# Patient Record
Sex: FEMALE | Race: WHITE | NOT HISPANIC OR LATINO
[De-identification: names, ages, dates, MRNs, and addresses within clinical notes are randomized per-mention and may not be internally consistent; named-entity substitution may affect disease eponyms.]

---

## 2017-02-24 ENCOUNTER — APPOINTMENT (OUTPATIENT)
Dept: HEMATOLOGY ONCOLOGY | Facility: CLINIC | Age: 62
End: 2017-02-24

## 2017-08-23 ENCOUNTER — OUTPATIENT (OUTPATIENT)
Dept: OUTPATIENT SERVICES | Facility: HOSPITAL | Age: 62
LOS: 1 days | Discharge: HOME | End: 2017-08-23

## 2017-08-23 DIAGNOSIS — Z12.31 ENCOUNTER FOR SCREENING MAMMOGRAM FOR MALIGNANT NEOPLASM OF BREAST: ICD-10-CM

## 2018-06-20 ENCOUNTER — APPOINTMENT (OUTPATIENT)
Dept: UROLOGY | Facility: CLINIC | Age: 63
End: 2018-06-20
Payer: MEDICARE

## 2018-06-20 VITALS
BODY MASS INDEX: 32.99 KG/M2 | DIASTOLIC BLOOD PRESSURE: 65 MMHG | SYSTOLIC BLOOD PRESSURE: 104 MMHG | HEIGHT: 65 IN | HEART RATE: 81 BPM | WEIGHT: 198 LBS

## 2018-06-20 DIAGNOSIS — Z84.1 FAMILY HISTORY OF DISORDERS OF KIDNEY AND URETER: ICD-10-CM

## 2018-06-20 DIAGNOSIS — I10 ESSENTIAL (PRIMARY) HYPERTENSION: ICD-10-CM

## 2018-06-20 DIAGNOSIS — E72.11 HOMOCYSTINURIA: ICD-10-CM

## 2018-06-20 DIAGNOSIS — M19.90 UNSPECIFIED OSTEOARTHRITIS, UNSPECIFIED SITE: ICD-10-CM

## 2018-06-20 DIAGNOSIS — I51.9 HEART DISEASE, UNSPECIFIED: ICD-10-CM

## 2018-06-20 DIAGNOSIS — E87.6 HYPOKALEMIA: ICD-10-CM

## 2018-06-20 DIAGNOSIS — Z78.9 OTHER SPECIFIED HEALTH STATUS: ICD-10-CM

## 2018-06-20 DIAGNOSIS — E11.9 TYPE 2 DIABETES MELLITUS W/OUT COMPLICATIONS: ICD-10-CM

## 2018-06-20 PROCEDURE — 99203 OFFICE O/P NEW LOW 30 MIN: CPT

## 2018-06-20 RX ORDER — LIDOCAINE HYDROCHLORIDE 10 MG/ML
1 INJECTION, SOLUTION INFILTRATION; PERINEURAL
Qty: 20 | Refills: 0 | Status: ACTIVE | COMMUNITY
Start: 2017-02-03

## 2018-06-20 RX ORDER — INSULIN GLARGINE 100 [IU]/ML
100 INJECTION, SOLUTION SUBCUTANEOUS
Qty: 10 | Refills: 0 | Status: ACTIVE | COMMUNITY
Start: 2016-07-12

## 2018-06-20 RX ORDER — LACTULOSE 10 G/15ML
10 SOLUTION ORAL
Qty: 473 | Refills: 0 | Status: ACTIVE | COMMUNITY
Start: 2017-01-30

## 2018-06-20 RX ORDER — DULOXETINE HYDROCHLORIDE 60 MG/1
60 CAPSULE, DELAYED RELEASE PELLETS ORAL
Qty: 30 | Refills: 0 | Status: ACTIVE | COMMUNITY
Start: 2016-06-03

## 2018-06-20 RX ORDER — LISINOPRIL 40 MG/1
40 TABLET ORAL
Qty: 30 | Refills: 0 | Status: ACTIVE | COMMUNITY
Start: 2016-06-14

## 2018-06-20 RX ORDER — INSULIN HUMAN 100 [IU]/ML
100 INJECTION, SOLUTION PARENTERAL
Qty: 3 | Refills: 0 | Status: ACTIVE | COMMUNITY
Start: 2016-08-05

## 2018-06-20 RX ORDER — DULOXETINE HYDROCHLORIDE 30 MG/1
30 CAPSULE, DELAYED RELEASE PELLETS ORAL
Qty: 30 | Refills: 0 | Status: ACTIVE | COMMUNITY
Start: 2016-06-03

## 2018-06-20 RX ORDER — IPRATROPIUM BROMIDE AND ALBUTEROL SULFATE 2.5; .5 MG/3ML; MG/3ML
0.5-2.5 (3) SOLUTION RESPIRATORY (INHALATION)
Qty: 90 | Refills: 0 | Status: ACTIVE | COMMUNITY
Start: 2017-02-01

## 2018-06-20 RX ORDER — CARVEDILOL 6.25 MG/1
6.25 TABLET, FILM COATED ORAL
Qty: 60 | Refills: 0 | Status: ACTIVE | COMMUNITY
Start: 2016-06-14

## 2018-06-20 RX ORDER — ATORVASTATIN CALCIUM 40 MG/1
40 TABLET, FILM COATED ORAL
Qty: 30 | Refills: 0 | Status: ACTIVE | COMMUNITY
Start: 2016-06-17

## 2018-06-20 RX ORDER — METFORMIN HYDROCHLORIDE 850 MG/1
850 TABLET, COATED ORAL
Qty: 60 | Refills: 0 | Status: ACTIVE | COMMUNITY
Start: 2016-06-20

## 2018-06-20 RX ORDER — GABAPENTIN 100 MG/1
100 CAPSULE ORAL
Qty: 180 | Refills: 0 | Status: ACTIVE | COMMUNITY
Start: 2016-02-04

## 2018-06-20 RX ORDER — OXYCODONE AND ACETAMINOPHEN 10; 325 MG/1; MG/1
10-325 TABLET ORAL
Qty: 90 | Refills: 0 | Status: ACTIVE | COMMUNITY
Start: 2016-10-16

## 2018-06-20 RX ORDER — FUROSEMIDE 40 MG/1
40 TABLET ORAL
Qty: 14 | Refills: 0 | Status: ACTIVE | COMMUNITY
Start: 2016-06-19

## 2018-06-20 RX ORDER — CEFTRIAXONE 1 G/1
1 INJECTION, POWDER, FOR SOLUTION INTRAMUSCULAR; INTRAVENOUS
Qty: 3 | Refills: 0 | Status: ACTIVE | COMMUNITY
Start: 2017-02-02

## 2018-06-20 RX ORDER — LEVOCETIRIZINE DIHYDROCHLORIDE 5 MG/1
5 TABLET ORAL
Qty: 30 | Refills: 0 | Status: ACTIVE | COMMUNITY
Start: 2016-07-05

## 2018-06-20 RX ORDER — PREDNISONE 10 MG/1
10 TABLET ORAL
Qty: 18 | Refills: 0 | Status: ACTIVE | COMMUNITY
Start: 2017-02-02

## 2018-06-20 RX ORDER — AMLODIPINE BESYLATE 5 MG/1
5 TABLET ORAL
Qty: 30 | Refills: 0 | Status: ACTIVE | COMMUNITY
Start: 2016-07-01

## 2018-06-20 RX ORDER — SILVER SULFADIAZINE 10 MG/G
1 CREAM TOPICAL
Qty: 50 | Refills: 0 | Status: ACTIVE | COMMUNITY
Start: 2017-01-30

## 2018-06-20 RX ORDER — BACLOFEN 10 MG/1
10 TABLET ORAL
Qty: 90 | Refills: 0 | Status: ACTIVE | COMMUNITY
Start: 2016-07-17

## 2018-06-20 RX ORDER — ZOLPIDEM TARTRATE 10 MG/1
10 TABLET ORAL
Qty: 30 | Refills: 0 | Status: ACTIVE | COMMUNITY
Start: 2016-10-18

## 2018-06-20 RX ORDER — ISOSORBIDE MONONITRATE 60 MG/1
60 TABLET, EXTENDED RELEASE ORAL
Qty: 30 | Refills: 0 | Status: ACTIVE | COMMUNITY
Start: 2016-06-22

## 2018-06-20 RX ORDER — ZOLPIDEM TARTRATE 5 MG/1
5 TABLET ORAL
Qty: 30 | Refills: 0 | Status: ACTIVE | COMMUNITY
Start: 2016-11-12

## 2018-06-20 RX ORDER — LEVOFLOXACIN 500 MG/1
500 TABLET, FILM COATED ORAL
Qty: 7 | Refills: 0 | Status: ACTIVE | COMMUNITY
Start: 2016-12-24

## 2018-06-25 ENCOUNTER — INPATIENT (INPATIENT)
Facility: HOSPITAL | Age: 63
LOS: 7 days | Discharge: NURSING, REHAB OR LONG-TERM CARE | End: 2018-07-03
Attending: INTERNAL MEDICINE | Admitting: INTERNAL MEDICINE
Payer: MEDICARE

## 2018-06-25 VITALS
RESPIRATION RATE: 18 BRPM | OXYGEN SATURATION: 96 % | SYSTOLIC BLOOD PRESSURE: 95 MMHG | HEART RATE: 78 BPM | DIASTOLIC BLOOD PRESSURE: 59 MMHG | TEMPERATURE: 98 F

## 2018-06-25 LAB
ALBUMIN SERPL ELPH-MCNC: 3.1 G/DL — LOW (ref 3.5–5.2)
ALP SERPL-CCNC: 97 U/L — SIGNIFICANT CHANGE UP (ref 30–115)
ALT FLD-CCNC: 11 U/L — SIGNIFICANT CHANGE UP (ref 0–41)
ANION GAP SERPL CALC-SCNC: 15 MMOL/L — HIGH (ref 7–14)
APTT BLD: 32.2 SEC — SIGNIFICANT CHANGE UP (ref 27–39.2)
AST SERPL-CCNC: 23 U/L — SIGNIFICANT CHANGE UP (ref 0–41)
BILIRUB DIRECT SERPL-MCNC: <0.2 MG/DL — SIGNIFICANT CHANGE UP (ref 0–0.2)
BILIRUB INDIRECT FLD-MCNC: SIGNIFICANT CHANGE UP MG/DL (ref 0.2–1.2)
BILIRUB SERPL-MCNC: <0.2 MG/DL — SIGNIFICANT CHANGE UP (ref 0.2–1.2)
BLD GP AB SCN SERPL QL: SIGNIFICANT CHANGE UP
BUN SERPL-MCNC: 23 MG/DL — HIGH (ref 10–20)
CALCIUM SERPL-MCNC: 9.2 MG/DL — SIGNIFICANT CHANGE UP (ref 8.5–10.1)
CHLORIDE SERPL-SCNC: 96 MMOL/L — LOW (ref 98–110)
CO2 SERPL-SCNC: 23 MMOL/L — SIGNIFICANT CHANGE UP (ref 17–32)
CREAT SERPL-MCNC: 1.1 MG/DL — SIGNIFICANT CHANGE UP (ref 0.7–1.5)
ERYTHROCYTE [SEDIMENTATION RATE] IN BLOOD: 86 MM/HR — HIGH (ref 0–20)
ESTIMATED AVERAGE GLUCOSE: 237 MG/DL — HIGH (ref 68–114)
GLUCOSE SERPL-MCNC: 201 MG/DL — HIGH (ref 70–99)
HBA1C BLD-MCNC: 9.9 % — HIGH (ref 4–5.6)
HCT VFR BLD CALC: 32.6 % — LOW (ref 37–47)
HGB BLD-MCNC: 10.5 G/DL — LOW (ref 12–16)
INR BLD: 1.21 RATIO — SIGNIFICANT CHANGE UP (ref 0.65–1.3)
MCHC RBC-ENTMCNC: 26.4 PG — LOW (ref 27–31)
MCHC RBC-ENTMCNC: 32.2 G/DL — SIGNIFICANT CHANGE UP (ref 32–37)
MCV RBC AUTO: 81.9 FL — SIGNIFICANT CHANGE UP (ref 81–99)
NRBC # BLD: 0 /100 WBCS — SIGNIFICANT CHANGE UP (ref 0–0)
PLATELET # BLD AUTO: 258 K/UL — SIGNIFICANT CHANGE UP (ref 130–400)
POTASSIUM SERPL-MCNC: 4.5 MMOL/L — SIGNIFICANT CHANGE UP (ref 3.5–5)
POTASSIUM SERPL-SCNC: 4.5 MMOL/L — SIGNIFICANT CHANGE UP (ref 3.5–5)
PROT SERPL-MCNC: 6.6 G/DL — SIGNIFICANT CHANGE UP (ref 6–8)
PROTHROM AB SERPL-ACNC: 13 SEC — HIGH (ref 9.95–12.87)
RBC # BLD: 3.98 M/UL — LOW (ref 4.2–5.4)
RBC # FLD: 19.6 % — HIGH (ref 11.5–14.5)
SODIUM SERPL-SCNC: 134 MMOL/L — LOW (ref 135–146)
TYPE + AB SCN PNL BLD: SIGNIFICANT CHANGE UP
WBC # BLD: 14.55 K/UL — HIGH (ref 4.8–10.8)
WBC # FLD AUTO: 14.55 K/UL — HIGH (ref 4.8–10.8)

## 2018-06-25 PROCEDURE — 93925 LOWER EXTREMITY STUDY: CPT | Mod: 26

## 2018-06-25 PROCEDURE — 93923 UPR/LXTR ART STDY 3+ LVLS: CPT | Mod: 26

## 2018-06-25 PROCEDURE — 93970 EXTREMITY STUDY: CPT | Mod: 26

## 2018-06-25 RX ORDER — FUROSEMIDE 40 MG
0 TABLET ORAL
Qty: 30 | Refills: 0 | COMMUNITY

## 2018-06-25 RX ORDER — DEXTROSE 50 % IN WATER 50 %
12.5 SYRINGE (ML) INTRAVENOUS ONCE
Qty: 0 | Refills: 0 | Status: DISCONTINUED | OUTPATIENT
Start: 2018-06-25 | End: 2018-06-29

## 2018-06-25 RX ORDER — BACLOFEN 100 %
0 POWDER (GRAM) MISCELLANEOUS
Qty: 90 | Refills: 0 | COMMUNITY

## 2018-06-25 RX ORDER — GLUCAGON INJECTION, SOLUTION 0.5 MG/.1ML
1 INJECTION, SOLUTION SUBCUTANEOUS ONCE
Qty: 0 | Refills: 0 | Status: DISCONTINUED | OUTPATIENT
Start: 2018-06-25 | End: 2018-06-29

## 2018-06-25 RX ORDER — SODIUM CHLORIDE 9 MG/ML
1000 INJECTION, SOLUTION INTRAVENOUS
Qty: 0 | Refills: 0 | Status: DISCONTINUED | OUTPATIENT
Start: 2018-06-25 | End: 2018-06-29

## 2018-06-25 RX ORDER — LEVOCETIRIZINE DIHYDROCHLORIDE 0.5 MG/ML
0 SOLUTION ORAL
Qty: 30 | Refills: 0 | COMMUNITY

## 2018-06-25 RX ORDER — CARVEDILOL PHOSPHATE 80 MG/1
0 CAPSULE, EXTENDED RELEASE ORAL
Qty: 120 | Refills: 0 | COMMUNITY

## 2018-06-25 RX ORDER — VANCOMYCIN HCL 1 G
1000 VIAL (EA) INTRAVENOUS ONCE
Qty: 0 | Refills: 0 | Status: COMPLETED | OUTPATIENT
Start: 2018-06-25 | End: 2018-06-25

## 2018-06-25 RX ORDER — OXYCODONE AND ACETAMINOPHEN 5; 325 MG/1; MG/1
1 TABLET ORAL ONCE
Qty: 0 | Refills: 0 | Status: DISCONTINUED | OUTPATIENT
Start: 2018-06-25 | End: 2018-06-25

## 2018-06-25 RX ORDER — VANCOMYCIN HCL 1 G
1000 VIAL (EA) INTRAVENOUS EVERY 12 HOURS
Qty: 0 | Refills: 0 | Status: DISCONTINUED | OUTPATIENT
Start: 2018-06-26 | End: 2018-06-29

## 2018-06-25 RX ORDER — NICOTINE POLACRILEX 2 MG
1 GUM BUCCAL DAILY
Qty: 0 | Refills: 0 | Status: DISCONTINUED | OUTPATIENT
Start: 2018-06-25 | End: 2018-06-29

## 2018-06-25 RX ORDER — METOLAZONE 5 MG/1
0 TABLET ORAL
Qty: 30 | Refills: 0 | COMMUNITY

## 2018-06-25 RX ORDER — DEXTROSE 50 % IN WATER 50 %
25 SYRINGE (ML) INTRAVENOUS ONCE
Qty: 0 | Refills: 0 | Status: DISCONTINUED | OUTPATIENT
Start: 2018-06-25 | End: 2018-06-29

## 2018-06-25 RX ORDER — UMECLIDINIUM BROMIDE AND VILANTEROL TRIFENATATE 62.5; 25 UG/1; UG/1
0 POWDER RESPIRATORY (INHALATION)
Qty: 60 | Refills: 0 | COMMUNITY

## 2018-06-25 RX ORDER — AMPICILLIN SODIUM AND SULBACTAM SODIUM 250; 125 MG/ML; MG/ML
1.5 INJECTION, POWDER, FOR SUSPENSION INTRAMUSCULAR; INTRAVENOUS EVERY 6 HOURS
Qty: 0 | Refills: 0 | Status: DISCONTINUED | OUTPATIENT
Start: 2018-06-25 | End: 2018-06-26

## 2018-06-25 RX ORDER — INSULIN LISPRO 100/ML
2 VIAL (ML) SUBCUTANEOUS
Qty: 0 | Refills: 0 | Status: DISCONTINUED | OUTPATIENT
Start: 2018-06-25 | End: 2018-06-26

## 2018-06-25 RX ORDER — ENOXAPARIN SODIUM 100 MG/ML
40 INJECTION SUBCUTANEOUS DAILY
Qty: 0 | Refills: 0 | Status: DISCONTINUED | OUTPATIENT
Start: 2018-06-25 | End: 2018-06-29

## 2018-06-25 RX ORDER — INSULIN GLARGINE 100 [IU]/ML
40 INJECTION, SOLUTION SUBCUTANEOUS AT BEDTIME
Qty: 0 | Refills: 0 | Status: DISCONTINUED | OUTPATIENT
Start: 2018-06-25 | End: 2018-06-26

## 2018-06-25 RX ORDER — OXYCODONE AND ACETAMINOPHEN 5; 325 MG/1; MG/1
1 TABLET ORAL EVERY 4 HOURS
Qty: 0 | Refills: 0 | Status: DISCONTINUED | OUTPATIENT
Start: 2018-06-25 | End: 2018-06-26

## 2018-06-25 RX ORDER — OMEPRAZOLE 10 MG/1
0 CAPSULE, DELAYED RELEASE ORAL
Qty: 30 | Refills: 0 | COMMUNITY

## 2018-06-25 RX ORDER — MIRTAZAPINE 45 MG/1
0 TABLET, ORALLY DISINTEGRATING ORAL
Qty: 30 | Refills: 0 | COMMUNITY

## 2018-06-25 RX ORDER — INSULIN LISPRO 100/ML
12 VIAL (ML) SUBCUTANEOUS
Qty: 0 | Refills: 0 | Status: DISCONTINUED | OUTPATIENT
Start: 2018-06-25 | End: 2018-06-25

## 2018-06-25 RX ORDER — FUROSEMIDE 40 MG
20 TABLET ORAL DAILY
Qty: 0 | Refills: 0 | Status: DISCONTINUED | OUTPATIENT
Start: 2018-06-25 | End: 2018-06-29

## 2018-06-25 RX ORDER — CEFTRIAXONE 500 MG/1
2 INJECTION, POWDER, FOR SOLUTION INTRAMUSCULAR; INTRAVENOUS ONCE
Qty: 0 | Refills: 0 | Status: COMPLETED | OUTPATIENT
Start: 2018-06-25 | End: 2018-06-25

## 2018-06-25 RX ORDER — LACTULOSE 10 G/15ML
0 SOLUTION ORAL
Qty: 473 | Refills: 0 | COMMUNITY

## 2018-06-25 RX ORDER — DEXTROSE 50 % IN WATER 50 %
15 SYRINGE (ML) INTRAVENOUS ONCE
Qty: 0 | Refills: 0 | Status: DISCONTINUED | OUTPATIENT
Start: 2018-06-25 | End: 2018-06-29

## 2018-06-25 RX ORDER — IPRATROPIUM/ALBUTEROL SULFATE 18-103MCG
0 AEROSOL WITH ADAPTER (GRAM) INHALATION
Qty: 90 | Refills: 0 | COMMUNITY

## 2018-06-25 RX ADMIN — AMPICILLIN SODIUM AND SULBACTAM SODIUM 100 GRAM(S): 250; 125 INJECTION, POWDER, FOR SUSPENSION INTRAMUSCULAR; INTRAVENOUS at 22:44

## 2018-06-25 RX ADMIN — Medication 1 PATCH: at 22:44

## 2018-06-25 RX ADMIN — OXYCODONE AND ACETAMINOPHEN 1 TABLET(S): 5; 325 TABLET ORAL at 18:20

## 2018-06-25 RX ADMIN — OXYCODONE AND ACETAMINOPHEN 1 TABLET(S): 5; 325 TABLET ORAL at 21:33

## 2018-06-25 RX ADMIN — INSULIN GLARGINE 40 UNIT(S): 100 INJECTION, SOLUTION SUBCUTANEOUS at 22:45

## 2018-06-25 RX ADMIN — OXYCODONE AND ACETAMINOPHEN 1 TABLET(S): 5; 325 TABLET ORAL at 17:58

## 2018-06-25 RX ADMIN — CEFTRIAXONE 100 GRAM(S): 500 INJECTION, POWDER, FOR SOLUTION INTRAMUSCULAR; INTRAVENOUS at 16:30

## 2018-06-25 RX ADMIN — Medication 250 MILLIGRAM(S): at 17:40

## 2018-06-25 NOTE — H&P ADULT - ATTENDING COMMENTS
Patient seen and examined at the bedside. not in distress.   Agree with above note.   Admitted for the foot gangrene. will follow up with vascular and podiatry

## 2018-06-25 NOTE — H&P ADULT - PMH
COPD (chronic obstructive pulmonary disease)    Depression    Diabetes    Hypertension    Psychiatric disorder

## 2018-06-25 NOTE — H&P ADULT - ASSESSMENT
64 y/o female with h/o PVD, copd not on home oxygen , ELEUTERIO not on cpap, dm-2 , htn , psychiatric disorder sent in from NH as per podiatry referral for evaluation and treatment of Right foot gangrene and worsening pain    1)NECROTIC RIGHT 2 ND DIGIT -  ADMIT TO MEDICINE  Podiatry recs- ID and vascular consult  wound dressing as per ID  Blood glucose control   prn pain control  blood culture  add iv abx - unasyn and vancomycin     2)HTN- stable  3)PAD- f/u vascular consult  4)copd- stable , no respiratory distress  advised to quit smoking  5)psychiatric disorder   6)dvt ppx  7)full code , from [facility] 62 y/o female with h/o PVD, copd not on home oxygen , ELEUTERIO not on cpap, dm-2 , htn , psychiatric disorder sent in from NH as per podiatry referral for evaluation and treatment of Right foot gangrene and worsening pain    1)NECROTIC RIGHT 2 ND DIGIT -  ADMIT TO MEDICINE  Podiatry recs- ID and vascular consult  wound dressing as per ID  Blood glucose control   prn pain control  blood culture  add iv abx - unasyn and vancomycin   xray right foot - ordered f/u read    2)HTN- stable  3)PAD- f/u vascular consult  4)copd- stable , no respiratory distress  advised to quit smoking  5)psychiatric disorder   6)dvt ppx  7)full code , from NH 64 y/o female with h/o PVD, copd not on home oxygen , ELEUTERIO not on cpap, dm-2 , htn , psychiatric disorder sent in from NH as per podiatry referral for evaluation and treatment of Right foot gangrene and worsening pain    1)NECROTIC RIGHT 2 ND DIGIT -  ADMIT TO MEDICINE  Podiatry recs- ID and vascular consult  wound dressing as per ID  Blood glucose control   prn pain control  blood culture  add iv abx - unasyn and vancomycin   xray right foot - ordered f/u read    2)HTN- stable  3)PAD- f/u vascular consult  4)copd- stable , no respiratory distress  advised to quit smoking  5)h/o dm-2 on NH records and as per patient- no medication  get one hba1c  5)psychiatric disorder - patient not on any psych medications as per NH records  6)dvt ppx  7)DNR/DNI , from NH 62 y/o female with h/o PVD, copd not on home oxygen , ELEUTERIO not on cpap, dm-2 , htn , psychiatric disorder sent in from NH as per podiatry referral for evaluation and treatment of Right foot gangrene and worsening pain    1)NECROTIC RIGHT 2 ND DIGIT -  ADMIT TO MEDICINE  Podiatry recs- ID and vascular consult  wound dressing as per ID  Blood glucose control   prn pain control  blood culture  add iv abx - unasyn and vancomycin   xray right foot - ordered f/u read    2)HTN- stable  3)PAD- f/u vascular consult  4)copd- stable , no respiratory distress  advised to quit smoking  5)h/o dm-2 on NH records and as per patient- no medication  get one hba1c  5)psychiatric disorder - patient not on any psych medications as per NH records  please reconfirm meds in am from NH   6)dvt ppx  7)DNR/DNI , from NH 64 y/o female with h/o PVD, copd not on home oxygen , ELETUERIO not on cpap, dm-2 , htn , psychiatric disorder sent in from NH as per podiatry referral for evaluation and treatment of Right foot gangrene and worsening pain    1) NECROTIC RIGHT 2 ND DIGIT -  ADMIT TO MEDICINE  Podiatry recs- ID and vascular consult  wound dressing as per ID  Blood glucose control   prn pain control  blood culture  add iv abx - unasyn and vancomycin   xray right foot - ordered f/u read    2)HTN- stable  3)PAD- f/u vascular consult  4)copd- stable , no respiratory distress  advised to quit smoking    5)h/o dm-2 on NH records and as per patient- no medication    get one hba1c    5)psychiatric disorder - patient not on any psych medications as per NH records    6)dvt ppx  7)DNR/DNI , from NH

## 2018-06-25 NOTE — ED PROVIDER NOTE - PHYSICAL EXAMINATION
CONSTITUTIONAL: well-appearing, in NAD  HEAD: NCAT  EYES: EOMI, PERRLA, no scleral icterus  CARD: RRR, no murmurs.  RESP: clear to ausculation b/l.  No rales, rhonchi, or wheezing.  ABD: soft, non-tender, non-distended, no rebound or guarding.  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 Multiple ulcerations on both feet. No DP or TP pulses palpated.  NEURO: Motor intact in b/l LE. Sensation diminished.  PSYCH: Cooperative, appropriate.

## 2018-06-25 NOTE — H&P ADULT - NSHPPHYSICALEXAM_GEN_ALL_CORE
General: NAD, AAO x3  HEENT: No icterus,. Moist mucous membranes  Neck: No JVD noted. Supple, no meningismus  Cardio: S1, S2 noted, RRR. No murmurs, rubs or gallops  Resp: Clear to auscultation b/l. No adventitious sounds  Abdo: Soft, NT, bowel sounds present. No organomegaly  Extremities: b/l 1 +  edema noted. Pulses present b/l  Neuro: AAO x3, grossly normal motor strength.  Skin: Dry, no rashes General: NAD, AAO x3  HEENT: No icterus,. Moist mucous membranes  Neck: No JVD noted. Supple, no meningismus  Cardio: S1, S2 noted, RRR. No murmurs, rubs or gallops  Resp: Clear to auscultation b/l. No adventitious sounds  Abdo: Soft, NT, bowel sounds present. No organomegaly  Extremities: b/l 1 +  edema noted. Pulses present b/l , bandage + b/l foot -done by podiatry   Neuro: AAO x3, grossly normal motor strength.

## 2018-06-25 NOTE — ED PROVIDER NOTE - NS ED ROS FT
Constitutional:  No fever, chills, lethargy.  Cardiac:  No chest pain or palpitations  Respiratory:  No cough or respiratory distress.   GI:  No abdominal pain.  Neuro:  + pain and numbness in b/l feet  Skin:  R foot gangrene.

## 2018-06-25 NOTE — H&P ADULT - NSHPLABSRESULTS_GEN_ALL_CORE
LABS:                        10.5   14.55 )-----------( 258      ( 25 Jun 2018 15:29 )             32.6     25 Jun 2018 15:29    134    |  96     |  23     ----------------------------<  201    4.5     |  23     |  1.1      Ca    9.2        25 Jun 2018 15:29    TPro  6.6    /  Alb  3.1    /  TBili  <0.2   /  DBili  <0.2   /  AST  23     /  ALT  11     /  AlkPhos  97     25 Jun 2018 15:29    PT/INR - ( 25 Jun 2018 15:29 )   PT: 13.00 sec;   INR: 1.21 ratio         PTT - ( 25 Jun 2018 15:29 )  PTT:32.2 sec

## 2018-06-25 NOTE — ED PROVIDER NOTE - PROGRESS NOTE DETAILS
ATTENDING NOTE: 62 y/o female with hx of PVD, previous Right 1st toe amputations. c/o increased pain to right toes x 1 week. No fever. O/E: Wet gangrene to right toes 2 and 3. Normal temperature and color to foot. No acute arterial occlusion. A/P: Abx, podiatry consult, admit. Pt. was seen by podiatry at bedside.

## 2018-06-25 NOTE — H&P ADULT - NSHPREVIEWOFSYSTEMS_GEN_ALL_CORE
REVIEW OF SYSTEMS:    CONSTITUTIONAL: No weakness, fevers or chills  EYES/ENT: No visual changes;  No vertigo or throat pain   NECK: No pain or stiffness  RESPIRATORY: No cough, wheezing, hemoptysis; No shortness of breath  CARDIOVASCULAR: No chest pain or palpitations  GASTROINTESTINAL: No abdominal or epigastric pain. No nausea, vomiting, or hematemesis; No diarrhea or constipation. No melena or hematochezia.  GENITOURINARY: No dysuria, frequency or hematuria  NEUROLOGICAL: No numbness or weakness  SKIN: chronic wound and ulcer right foot REVIEW OF SYSTEMS:    CONSTITUTIONAL: No weakness, fevers or chills  EYES/ENT: No visual changes;  No vertigo or throat pain   NECK: No pain or stiffness  RESPIRATORY: No cough, wheezing, hemoptysis; No shortness of breath  CARDIOVASCULAR: No chest pain or palpitations  GASTROINTESTINAL: No abdominal or epigastric pain. No nausea, vomiting, or hematemesis; No diarrhea or constipation. No melena or hematochezia.  GENITOURINARY: No dysuria, frequency or hematuria  NEUROLOGICAL: No numbness or weakness  SKIN: chronic wound and ulcer right 2nd digit

## 2018-06-25 NOTE — ED PROVIDER NOTE - OBJECTIVE STATEMENT
64 y/o F w/ PMHx PVD, HTN, COPD, DM p/w R foot gangrene from nursing home referred by her Podiatrist Dr. Tobias. Currently pt has no complaints except for pain in her feet which is chronic. She denies fever, SOB, CP, any new ulcerations, worsening edema. Pt. states that she was sent for admission for continuous IV antibiotics and pre-op evaluation for R toe amputations.

## 2018-06-25 NOTE — H&P ADULT - HISTORY OF PRESENT ILLNESS
64 y/o Female smoker  w/ history of PVD, HTN, COPD not on home oxygen ,ELEUTERIO not on cpap , DM-2  sent in from NH for  Right foot gangrene, referred by her Podiatrist Dr. Tobias.  As per patient she has pain right  foot worsening x 1 week partially relieved by percocet. Denies fever, chills, nausea , vomiting or any constitutional symptoms. As per patient she is being sent in from for IV antibiotics and further evaluation for possible debridement of right toe.  In ED rX ROCEPHIN 2 G, VANCOMYCIN AND PERCOCET 62 y/o Female smoker  w/ history of PVD, HTN, COPD not on home oxygen ,ELEUTERIO not on cpap , DM-2  sent in from NH for necrotic Right 2nd digit , referred by her Podiatrist Dr. Tobias.  As per patient she has pain right  foot worsening x 1 week partially relieved by percocet. Denies fever, chills, nausea , vomiting or any constitutional symptoms. As per patient she is being sent in from for IV antibiotics and further evaluation for possible debridement of right toe.    In ED rX ROCEPHIN 2 G, VANCOMYCIN AND PERCOCET 62 y/o Female smoker  w/ history of PVD, HTN, COPD not on home oxygen ,ELEUTERIO not on cpap , DM-2(unsure about history)  sent in from NH for necrotic Right 2nd digit , referred by her Podiatrist Dr. Tobias.  As per patient she has pain right  foot worsening x 1 week partially relieved by percocet. Denies fever, chills, nausea , vomiting or any constitutional symptoms. As per patient she is being sent in from for IV antibiotics and further evaluation for possible debridement of right toe.    In ED rX ROCEPHIN 2 G, VANCOMYCIN AND PERCOCET

## 2018-06-25 NOTE — CONSULT NOTE ADULT - ASSESSMENT
She has multi system dz with wet gangrene to he right foot    she will need medical clearance and vasc eval for severe PVD    continue with IV Abx and f/u with ID recommendation      recommend stop smoking     pt is being scheduled for TMA, debridement of soft tissue and bone right foot

## 2018-06-26 LAB
ANION GAP SERPL CALC-SCNC: 13 MMOL/L — SIGNIFICANT CHANGE UP (ref 7–14)
BUN SERPL-MCNC: 21 MG/DL — HIGH (ref 10–20)
CALCIUM SERPL-MCNC: 9.6 MG/DL — SIGNIFICANT CHANGE UP (ref 8.5–10.1)
CHLORIDE SERPL-SCNC: 97 MMOL/L — LOW (ref 98–110)
CO2 SERPL-SCNC: 27 MMOL/L — SIGNIFICANT CHANGE UP (ref 17–32)
CREAT SERPL-MCNC: 0.8 MG/DL — SIGNIFICANT CHANGE UP (ref 0.7–1.5)
ESTIMATED AVERAGE GLUCOSE: 237 MG/DL — HIGH (ref 68–114)
GLUCOSE SERPL-MCNC: 168 MG/DL — HIGH (ref 70–99)
HBA1C BLD-MCNC: 9.9 % — HIGH (ref 4–5.6)
HCT VFR BLD CALC: 31.2 % — LOW (ref 37–47)
HGB BLD-MCNC: 9.9 G/DL — LOW (ref 12–16)
MCHC RBC-ENTMCNC: 26 PG — LOW (ref 27–31)
MCHC RBC-ENTMCNC: 31.7 G/DL — LOW (ref 32–37)
MCV RBC AUTO: 81.9 FL — SIGNIFICANT CHANGE UP (ref 81–99)
NRBC # BLD: 0 /100 WBCS — SIGNIFICANT CHANGE UP (ref 0–0)
PLATELET # BLD AUTO: 250 K/UL — SIGNIFICANT CHANGE UP (ref 130–400)
POTASSIUM SERPL-MCNC: 3.9 MMOL/L — SIGNIFICANT CHANGE UP (ref 3.5–5)
POTASSIUM SERPL-SCNC: 3.9 MMOL/L — SIGNIFICANT CHANGE UP (ref 3.5–5)
RBC # BLD: 3.81 M/UL — LOW (ref 4.2–5.4)
RBC # FLD: 19.5 % — HIGH (ref 11.5–14.5)
SODIUM SERPL-SCNC: 137 MMOL/L — SIGNIFICANT CHANGE UP (ref 135–146)
WBC # BLD: 11.62 K/UL — HIGH (ref 4.8–10.8)
WBC # FLD AUTO: 11.62 K/UL — HIGH (ref 4.8–10.8)

## 2018-06-26 RX ORDER — AMPICILLIN SODIUM AND SULBACTAM SODIUM 250; 125 MG/ML; MG/ML
INJECTION, POWDER, FOR SUSPENSION INTRAMUSCULAR; INTRAVENOUS
Qty: 0 | Refills: 0 | Status: DISCONTINUED | OUTPATIENT
Start: 2018-06-26 | End: 2018-06-29

## 2018-06-26 RX ORDER — OXYCODONE AND ACETAMINOPHEN 5; 325 MG/1; MG/1
2 TABLET ORAL EVERY 6 HOURS
Qty: 0 | Refills: 0 | Status: DISCONTINUED | OUTPATIENT
Start: 2018-06-26 | End: 2018-06-26

## 2018-06-26 RX ORDER — INSULIN GLARGINE 100 [IU]/ML
20 INJECTION, SOLUTION SUBCUTANEOUS AT BEDTIME
Qty: 0 | Refills: 0 | Status: DISCONTINUED | OUTPATIENT
Start: 2018-06-26 | End: 2018-06-29

## 2018-06-26 RX ORDER — INSULIN LISPRO 100/ML
7 VIAL (ML) SUBCUTANEOUS
Qty: 0 | Refills: 0 | Status: DISCONTINUED | OUTPATIENT
Start: 2018-06-26 | End: 2018-06-29

## 2018-06-26 RX ORDER — MORPHINE SULFATE 50 MG/1
4 CAPSULE, EXTENDED RELEASE ORAL EVERY 4 HOURS
Qty: 0 | Refills: 0 | Status: DISCONTINUED | OUTPATIENT
Start: 2018-06-26 | End: 2018-06-29

## 2018-06-26 RX ORDER — AMPICILLIN SODIUM AND SULBACTAM SODIUM 250; 125 MG/ML; MG/ML
3 INJECTION, POWDER, FOR SUSPENSION INTRAMUSCULAR; INTRAVENOUS ONCE
Qty: 0 | Refills: 0 | Status: COMPLETED | OUTPATIENT
Start: 2018-06-26 | End: 2018-06-26

## 2018-06-26 RX ORDER — AMPICILLIN SODIUM AND SULBACTAM SODIUM 250; 125 MG/ML; MG/ML
3 INJECTION, POWDER, FOR SUSPENSION INTRAMUSCULAR; INTRAVENOUS EVERY 6 HOURS
Qty: 0 | Refills: 0 | Status: DISCONTINUED | OUTPATIENT
Start: 2018-06-26 | End: 2018-06-29

## 2018-06-26 RX ORDER — INSULIN LISPRO 100/ML
VIAL (ML) SUBCUTANEOUS
Qty: 0 | Refills: 0 | Status: DISCONTINUED | OUTPATIENT
Start: 2018-06-26 | End: 2018-06-29

## 2018-06-26 RX ORDER — INSULIN GLARGINE 100 [IU]/ML
23 INJECTION, SOLUTION SUBCUTANEOUS AT BEDTIME
Qty: 0 | Refills: 0 | Status: DISCONTINUED | OUTPATIENT
Start: 2018-06-26 | End: 2018-06-26

## 2018-06-26 RX ORDER — INSULIN LISPRO 100/ML
2 VIAL (ML) SUBCUTANEOUS ONCE
Qty: 0 | Refills: 0 | Status: COMPLETED | OUTPATIENT
Start: 2018-06-26 | End: 2018-06-26

## 2018-06-26 RX ADMIN — Medication 250 MILLIGRAM(S): at 05:48

## 2018-06-26 RX ADMIN — AMPICILLIN SODIUM AND SULBACTAM SODIUM 100 GRAM(S): 250; 125 INJECTION, POWDER, FOR SUSPENSION INTRAMUSCULAR; INTRAVENOUS at 05:48

## 2018-06-26 RX ADMIN — ENOXAPARIN SODIUM 40 MILLIGRAM(S): 100 INJECTION SUBCUTANEOUS at 13:09

## 2018-06-26 RX ADMIN — AMPICILLIN SODIUM AND SULBACTAM SODIUM 200 GRAM(S): 250; 125 INJECTION, POWDER, FOR SUSPENSION INTRAMUSCULAR; INTRAVENOUS at 23:13

## 2018-06-26 RX ADMIN — OXYCODONE AND ACETAMINOPHEN 1 TABLET(S): 5; 325 TABLET ORAL at 07:05

## 2018-06-26 RX ADMIN — AMPICILLIN SODIUM AND SULBACTAM SODIUM 100 GRAM(S): 250; 125 INJECTION, POWDER, FOR SUSPENSION INTRAMUSCULAR; INTRAVENOUS at 03:31

## 2018-06-26 RX ADMIN — OXYCODONE AND ACETAMINOPHEN 1 TABLET(S): 5; 325 TABLET ORAL at 01:58

## 2018-06-26 RX ADMIN — Medication 1 PATCH: at 13:09

## 2018-06-26 RX ADMIN — AMPICILLIN SODIUM AND SULBACTAM SODIUM 200 GRAM(S): 250; 125 INJECTION, POWDER, FOR SUSPENSION INTRAMUSCULAR; INTRAVENOUS at 09:36

## 2018-06-26 RX ADMIN — MORPHINE SULFATE 4 MILLIGRAM(S): 50 CAPSULE, EXTENDED RELEASE ORAL at 15:45

## 2018-06-26 RX ADMIN — INSULIN GLARGINE 20 UNIT(S): 100 INJECTION, SOLUTION SUBCUTANEOUS at 21:57

## 2018-06-26 RX ADMIN — Medication 1: at 17:04

## 2018-06-26 RX ADMIN — Medication 20 MILLIGRAM(S): at 05:48

## 2018-06-26 RX ADMIN — MORPHINE SULFATE 4 MILLIGRAM(S): 50 CAPSULE, EXTENDED RELEASE ORAL at 20:25

## 2018-06-26 RX ADMIN — Medication 2 UNIT(S): at 09:05

## 2018-06-26 RX ADMIN — Medication 2 UNIT(S): at 13:09

## 2018-06-26 RX ADMIN — AMPICILLIN SODIUM AND SULBACTAM SODIUM 200 GRAM(S): 250; 125 INJECTION, POWDER, FOR SUSPENSION INTRAMUSCULAR; INTRAVENOUS at 17:03

## 2018-06-26 RX ADMIN — Medication 250 MILLIGRAM(S): at 17:59

## 2018-06-26 RX ADMIN — Medication 7 UNIT(S): at 17:04

## 2018-06-26 RX ADMIN — Medication 2 UNIT(S): at 03:34

## 2018-06-26 RX ADMIN — OXYCODONE AND ACETAMINOPHEN 2 TABLET(S): 5; 325 TABLET ORAL at 11:07

## 2018-06-26 NOTE — CONSULT NOTE ADULT - ADDITIONAL PE
Right foot 2-3rd toes with gangrenous changes with purulence at bases with foul odor. No pulses.  Left 3rd toe with small ulcer.

## 2018-06-26 NOTE — PROVIDER CONTACT NOTE (OTHER) - SITUATION
made aware current [id number]. pt eating and drinking various non diabetic foods; spoke with pt/ she reports she usually takes 40 units LAntus at HS/ no Insulin ordered for this pt on admit orders

## 2018-06-26 NOTE — CONSULT NOTE ADULT - ASSESSMENT
Plan to get an arterial duplex, plan to discuss about a possible angiogram. Plan to get an arterial duplex with PVR, plan to discuss about a possible angiogram.

## 2018-06-26 NOTE — PROGRESS NOTE ADULT - ASSESSMENT
64 y/o female with h/o PAD, COPD not on home oxygen, ELEUTERIO not on CPAP, DM-2, HTN, psychiatric disorder sent in from NH as per podiatry referral for evaluation and treatment of Right foot gangrene and worsening pain    #Gangrene 2nd right toe with osteomyelitis  -XR shows osteo  -possible TMA Friday  -low risk patient for low risk surgery  -podiatry following  -pain control w/ morphine, inadequate pain control w/ Percocet 10  -f/u blood culture  -c/w vanco and unasyn per ID    #PAD  -vascular following, will get arterial duplex  -will also get venous duplex to r/o DVT given swelling    #HTN, controlled  -DASH diet    #COPD, stable  -f/u outpatient    #DM  -ISS and fingerstick monitoring  -check hgb a1xc    #DVT ppx  -Lovenox    #Code status  -DNR/DNI    #Dispo  -from NH, will go to NH 62 y/o female with h/o PAD, COPD not on home oxygen, ELEUTERIO not on CPAP, DM-2, HTN, psychiatric disorder sent in from NH as per podiatry referral for evaluation and treatment of Right foot gangrene and worsening pain    #Gangrene 2nd right toe with osteomyelitis  -XR shows osteo  -possible TMA Friday  -low risk patient for low risk surgery  -podiatry following  -pain control w/ morphine, inadequate pain control w/ Percocet 10  -f/u blood culture  -c/w vanco and unasyn per ID    #PAD  -Lasix PO 20 daily  -vascular following, will get arterial duplex  -will also get venous duplex to r/o DVT given swelling    #HTN, controlled  -DASH diet    #COPD, stable  -f/u outpatient    #DM  -ISS and fingerstick monitoring  -check hgb a1xc    #DVT ppx  -Lovenox    #Code status  -DNR/DNI    #Dispo  -from NH, will go to NH 64 y/o female with h/o PAD, COPD not on home oxygen, ELEUTERIO not on CPAP, DM-2, HTN, psychiatric disorder sent in from NH as per podiatry referral for evaluation and treatment of Right foot gangrene and worsening pain    #Gangrene 2nd right toe with osteomyelitis  -XR shows osteo  -possible TMA Friday  -podiatry following  -pain control w/ morphine, inadequate pain control w/ Percocet 10  -f/u blood culture  -c/w vanco and unasyn per ID    #PAD  -Lasix PO 20 daily  -vascular following, will get arterial duplex  -will also get venous duplex to r/o DVT given swelling    #HTN, controlled  -DASH diet    #COPD, stable  -f/u outpatient    #DM  -ISS and fingerstick monitoring  -check hgb a1xc    #DVT ppx  -Lovenox    #Code status  -DNR/DNI    #Dispo  -from NH, will go to NH

## 2018-06-26 NOTE — CONSULT NOTE ADULT - ASSESSMENT
IMPRESSION:  Wet gangrene of 2-3rd toe on the right foot with possible forefoot involvement.  Left foot with possible   septic arthritis of 3rd mid phalanx.  Significant PAD.    RECOMMENDATIONS:  Amputation planned.  Vancomycin 1 gm iv q12h  Unasyn 3 gm iv q6h

## 2018-06-26 NOTE — CONSULT NOTE ADULT - ATTENDING COMMENTS
Bilat foot ulcers. Art Duplex shows chronic bilat SFA occlusions and the PVRs show excellent collateral blood flow.  No vasc surg intervention needed.  Cont with local wound care  F/U in my office 3 wks post discharge.

## 2018-06-26 NOTE — PROGRESS NOTE ADULT - SUBJECTIVE AND OBJECTIVE BOX
PODIATRY PROGRESS NOTE   Pt was seen and assessed at bedside with attending Dr. Saenz today.  She sates that she has heel pain b/l.    Vital Signs Last 24 Hrs  T(C): 36.4 (26 Jun 2018 04:34), Max: 36.6 (25 Jun 2018 13:34)  T(F): 97.6 (26 Jun 2018 04:34), Max: 97.8 (25 Jun 2018 13:34)  HR: 84 (26 Jun 2018 04:34) (64 - 84)  BP: 155/67 (26 Jun 2018 04:34) (95/59 - 155/67)  RR: 18 (26 Jun 2018 04:34) (17 - 18)  SpO2: 97% (25 Jun 2018 20:05) (95% - 97%)                        9.9    11.62 )-----------( 250      ( 26 Jun 2018 07:15 )             31.2                 06-26    137  |  97<L>  |  21<H>  ----------------------------<  168<H>  3.9   |  27  |  0.8    Ca    9.6      26 Jun 2018 07:15    TPro  6.6  /  Alb  3.1<L>  /  TBili  <0.2  /  DBili  <0.2  /  AST  23  /  ALT  11  /  AlkPhos  97  06-25      A:  Necrotic right 2nd digit with infected left 3rd with consistent of OM  ulceration left 3rd and lateral aspect of the heel consistent of DFU.  P:  Wound Care Orders: Betadine dsd Kerlix right foot  santyl lateral aspect of the heel and bacitracin left 3rd digit with dsd Kerlix   Discussed the possible surgery dbx st/bone right foot possible TMA this friday   patient signed the consent presents of attending Dr. saenz.  Talked to vascular today; will fu with podiatry later.  Podiatry to follow up in q24-48h for continued evaluation and management.    Attending updated and added to note for review.   06-26-18 @ 09:56

## 2018-06-26 NOTE — PROGRESS NOTE ADULT - SUBJECTIVE AND OBJECTIVE BOX
PGY I NOTE    LENGTH OF HOSPITAL STAY:  1d    CHIEF COMPLAINT:Patient is a 63y old  Female who presents with a chief complaint of DFU R foot (25 Jun 2018 21:57)    HPI:HPI:  64 y/o Female smoker  w/ history of PVD, HTN, COPD not on home oxygen ,ELEUTERIO not on cpap , DM-2(unsure about history)  sent in from NH for necrotic Right 2nd digit , referred by her Podiatrist Dr. Tobias.  As per patient she has pain right  foot worsening x 1 week partially relieved by percocet. Denies fever, chills, nausea , vomiting or any constitutional symptoms. As per patient she is being sent in from for IV antibiotics and further evaluation for possible debridement of right toe.    In ED rX ROCEPHIN 2 G, VANCOMYCIN AND PERCOCET (25 Jun 2018 18:48)    OVERNIGHT EVENTS/UPDATES: no acute event overnight, c/o pain at foot    PMH & PSH  PAST MEDICAL & SURGICAL HISTORY:  Hypertension  Psychiatric disorder  Depression  COPD (chronic obstructive pulmonary disease)  Diabetes  No significant past surgical history    SOCIAL HISTORY: Negative    ALLERGIES: No Known Allergies    HOME MEDICATIONS  Home Medications:  ANORO ELLIPTA AEROSOL POWDER BREATH ACTIVATED:  (25 Jun 2018 20:16)  ATORVASTATIN CALCIUM 40MG TABLET:  (25 Jun 2018 20:16)  CLONAZEPAM .5MG TABLET:  (25 Jun 2018 20:16)  DULOXETINE HCL 30MG CAPSULE DELAYED RELEASE PARTICLES:  (25 Jun 2018 20:16)  ENALAPRIL MALEATE 10MG TABLET:  (25 Jun 2018 20:16)  FUROSEMIDE 20MG TABLET:  (25 Jun 2018 20:16)  GABAPENTIN 100MG CAPSULE:  (25 Jun 2018 20:16)  IPRATROPIUM BROMIDE/ALBUTEROL SULFATE SOLUTION:  (25 Jun 2018 20:16)  OXYCODONE/ACETAMINOPHEN TABLET:  (25 Jun 2018 20:16)    PHYSICAL EXAM:  T(F): 98.5 (06-26-18 @ 14:29), Max: 98.5 (06-26-18 @ 14:29)  HR: 66 (06-26-18 @ 14:29)  BP: 121/58 (06-26-18 @ 14:29)  RR: 18 (06-26-18 @ 14:29)  SpO2: 97% (06-25-18 @ 20:05)  CAPILLARY BLOOD GLUCOSE  193 (26 Jun 2018 11:15)  247 (26 Jun 2018 04:34)  309 (26 Jun 2018 02:04)  364 (25 Jun 2018 21:25)  304 (25 Jun 2018 20:09)        I&O's Summary    25 Jun 2018 07:01  -  26 Jun 2018 07:00  --------------------------------------------------------  IN: 50 mL / OUT: 0 mL / NET: 50 mL      General: NAD  HEENT: NCAT, no JVD  CV: RRR  RESP: CTAB  Abdominal: Soft, NTTP, non-distended  Extremity: R leg edematous  Neuro: A&O x3, non-focal    MEDICATIONS  STANDING MEDICATIONS  ampicillin/sulbactam  IVPB      ampicillin/sulbactam  IVPB 3 Gram(s) IV Intermittent every 6 hours  dextrose 5%. 1000 milliLiter(s) IV Continuous <Continuous>  dextrose 50% Injectable 12.5 Gram(s) IV Push once  dextrose 50% Injectable 25 Gram(s) IV Push once  dextrose 50% Injectable 25 Gram(s) IV Push once  enoxaparin Injectable 40 milliGRAM(s) SubCutaneous daily  furosemide    Tablet 20 milliGRAM(s) Oral daily  insulin lispro Injectable (HumaLOG) 2 Unit(s) SubCutaneous three times a day before meals  nicotine -  14 mG/24Hr(s) Patch 1 patch Transdermal daily  vancomycin  IVPB 1000 milliGRAM(s) IV Intermittent every 12 hours    PRN MEDICATIONS  dextrose 40% Gel 15 Gram(s) Oral once PRN  glucagon  Injectable 1 milliGRAM(s) IntraMuscular once PRN  morphine  - Injectable 4 milliGRAM(s) IV Push every 4 hours PRN    LABS:                        9.9    11.62 )-----------( 250      ( 26 Jun 2018 07:15 )             31.2              06-26    137  |  97<L>  |  21<H>  ----------------------------<  168<H>  3.9   |  27  |  0.8    Ca    9.6      26 Jun 2018 07:15    TPro  6.6  /  Alb  3.1<L>  /  TBili  <0.2  /  DBili  <0.2  /  AST  23  /  ALT  11  /  AlkPhos  97  06-25    LIVER FUNCTIONS - ( 25 Jun 2018 15:29 )  Alb: 3.1 g/dL / Pro: 6.6 g/dL / ALK PHOS: 97 U/L / ALT: 11 U/L / AST: 23 U/L / GGT: x                      PT/INR - ( 25 Jun 2018 15:29 )   PT: 13.00 sec;   INR: 1.21 ratio         PTT - ( 25 Jun 2018 15:29 )  PTT:32.2 sec

## 2018-06-26 NOTE — CONSULT NOTE ADULT - SUBJECTIVE AND OBJECTIVE BOX
PODIATRY CONSULT   GABY ASHLEY is a pleasant well-nourished, well developed 63y Female in no acute distress, alert awake, and oriented to person, place and time.   Patient is a 63y old  Female who presents with a chief complaint of right foot ulcerations.  HPI:  She states that she had this ulceration on right foot for a while now.  She does not know how it started but went to see Dr. Tobias last week.  He prescribed oral abx and discussed surgical intervention.  She came to the ED because pain has gotten worse, now its 8/10 pain.    PAST MEDICAL & SURGICAL HISTORY:  CHF  right 1st toe amputation    Vital Signs Last 24 Hrs  T(C): 36 (25 Jun 2018 17:35), Max: 36.6 (25 Jun 2018 13:34)  T(F): 96.8 (25 Jun 2018 17:35), Max: 97.8 (25 Jun 2018 13:34)  HR: 64 (25 Jun 2018 17:35) (64 - 78)  BP: 115/75 (25 Jun 2018 17:35) (95/59 - 115/75)  BP(mean): --  RR: 17 (25 Jun 2018 17:35) (17 - 18)  SpO2: 95% (25 Jun 2018 17:35) (95% - 96%)                          10.5   14.55 )-----------( 258      ( 25 Jun 2018 15:29 )             32.6     06-25    134<L>  |  96<L>  |  23<H>  ----------------------------<  201<H>  4.5   |  23  |  1.1    Ca    9.2      25 Jun 2018 15:29    TPro  6.6  /  Alb  3.1<L>  /  TBili  <0.2  /  DBili  <0.2  /  AST  23  /  ALT  11  /  AlkPhos  97  06-25    PT/INR - ( 25 Jun 2018 15:29 )   PT: 13.00 sec;   INR: 1.21 ratio         PTT - ( 25 Jun 2018 15:29 )  PTT:32.2 sec    PHYSICAL EXAM  LE Focused examination conducted:    DERM:  necrotic right 2nd toe/+malodor/+pus,   ulceration of the right 3rd digit and left 3rd digit.  VASC:   Dorsalis Pedis 0/4   Posterior Tibial 0/4    NEURO: Protective sensation intact to the level of the digits bilateral.  ORTHO: Muscle strength 5/5 all major muscle groups bilateral.    A:  Necrotic right 2nd digit  P:  pt was examined and evaluated  applied betadine/dsd/kerlix b/l  ordered right foot x ray  consult vascular  consult ID  follow up with podiatry q24h.  Attending updated and added to note as gwen.     06-25-18 @ 18:45
Vascular Surgery Consult Note    Patient is a 63y old  Female who presents with a chief complaint of DFU R foot (25 Jun 2018 21:57)    HPI:  64 y/o Female smoker  w/ history of PVD, HTN, COPD not on home oxygen ,ELEUTERIO not on cpap , DM-2(unsure about history)  sent in from NH for necrotic Right 2nd digit , referred by her Podiatrist Dr. Tobias.  As per patient she has pain right  foot worsening x 1 week partially relieved by percocet. Denies fever, chills, nausea , vomiting or any constitutional symptoms. As per patient she is being sent in from for IV antibiotics and further evaluation for possible debridement of right toe.    In ED rX ROCEPHIN 2 G, VANCOMYCIN AND PERCOCET (25 Jun 2018 18:48)      GABY ASHLEY is a 63yFemale    PAST MEDICAL & SURGICAL HISTORY:  Hypertension  Psychiatric disorder  Depression  COPD (chronic obstructive pulmonary disease)  Diabetes  No significant past surgical history      ampicillin/sulbactam  IVPB      ampicillin/sulbactam  IVPB 3 Gram(s) IV Intermittent every 6 hours  dextrose 40% Gel 15 Gram(s) Oral once PRN  dextrose 5%. 1000 milliLiter(s) IV Continuous <Continuous>  dextrose 50% Injectable 12.5 Gram(s) IV Push once  dextrose 50% Injectable 25 Gram(s) IV Push once  dextrose 50% Injectable 25 Gram(s) IV Push once  enoxaparin Injectable 40 milliGRAM(s) SubCutaneous daily  furosemide    Tablet 20 milliGRAM(s) Oral daily  glucagon  Injectable 1 milliGRAM(s) IntraMuscular once PRN  insulin lispro Injectable (HumaLOG) 2 Unit(s) SubCutaneous three times a day before meals  nicotine -  14 mG/24Hr(s) Patch 1 patch Transdermal daily  oxyCODONE    5 mG/acetaminophen 325 mG 1 Tablet(s) Oral every 4 hours PRN  vancomycin  IVPB 1000 milliGRAM(s) IV Intermittent every 12 hours    No Known Allergies    Social hx:     Vital Signs Last 24 Hrs  T(C): 36.4 (26 Jun 2018 04:34), Max: 36.6 (25 Jun 2018 13:34)  T(F): 97.6 (26 Jun 2018 04:34), Max: 97.8 (25 Jun 2018 13:34)  HR: 84 (26 Jun 2018 04:34) (64 - 84)  BP: 155/67 (26 Jun 2018 04:34) (95/59 - 155/67)  BP(mean): --  RR: 18 (26 Jun 2018 04:34) (17 - 18)  SpO2: 97% (25 Jun 2018 20:05) (95% - 97%)CAPILLARY BLOOD GLUCOSE  247 (26 Jun 2018 04:34)        I&O's Detail    25 Jun 2018 07:01  -  26 Jun 2018 07:00  --------------------------------------------------------  IN:    IV PiggyBack: 50 mL  Total IN: 50 mL    OUT:  Total OUT: 0 mL    Total NET: 50 mL          General: no pallor  HEENT: no icterus  Resp: b/l clear  CV: s1/[id number]  Abd: soft  Neuro: conscious, oriented  LE Focused examination   DERM:  necrotic right 2nd toe/+malodor/+pus,   ulceration of the right 3rd digit and left 3rd digit.  VASC:   Dorsalis Pedis 0/4   Posterior Tibial 0/4    NEURO: Protective sensation intact to the level of the digits bilateral.  ORTHO: Muscle strength 5/5 all major muscle groups bilateral.    Skin:      CBC Full  -  ( 26 Jun 2018 07:15 )  WBC Count : 11.62 K/uL  Hemoglobin : 9.9 g/dL  Hematocrit : 31.2 %  Platelet Count - Automated : 250 K/uL  Mean Cell Volume : 81.9 fL  Mean Cell Hemoglobin : 26.0 pg  Mean Cell Hemoglobin Concentration : 31.7 g/dL  Auto Neutrophil # : x  Auto Lymphocyte # : x  Auto Monocyte # : x  Auto Eosinophil # : x  Auto Basophil # : x  Auto Neutrophil % : x  Auto Lymphocyte % : x  Auto Monocyte % : x  Auto Eosinophil % : x  Auto Basophil % : x    06-26    137  |  97<L>  |  21<H>  ----------------------------<  168<H>  3.9   |  27  |  0.8    Ca    9.6      26 Jun 2018 07:15    TPro  6.6  /  Alb  3.1<L>  /  TBili  <0.2  /  DBili  <0.2  /  AST  23  /  ALT  11  /  AlkPhos  97  06-25    LIVER FUNCTIONS - ( 25 Jun 2018 15:29 )  Alb: 3.1 g/dL / Pro: 6.6 g/dL / ALK PHOS: 97 U/L / ALT: 11 U/L / AST: 23 U/L / GGT: x           PT/INR - ( 25 Jun 2018 15:29 )   PT: 13.00 sec;   INR: 1.21 ratio         PTT - ( 25 Jun 2018 15:29 )  PTT:32.2 sec
GABY ASHLEY  63y, Female  Allergy: No Known Allergies      HPI:  62 y/o Female smoker  w/ history of PVD, HTN, COPD not on home oxygen ,ELEUTERIO not on cpap , DM-2(unsure about history)  sent in from NH for necrotic Right 2nd digit , referred by her Podiatrist Dr. Tobias.  As per patient she has pain right  foot worsening x 1 week partially relieved by percocet. Denies fever, chills, nausea , vomiting or any constitutional symptoms. As per patient she is being sent in from for IV antibiotics and further evaluation for possible debridement of right toe.    In ED rX ROCEPHIN 2 G, VANCOMYCIN AND PERCOCET (25 Jun 2018 18:48)    FAMILY HISTORY:  No pertinent family history in first degree relatives    PAST MEDICAL & SURGICAL HISTORY:  Hypertension  Psychiatric disorder  Depression  COPD (chronic obstructive pulmonary disease)  Diabetes  No significant past surgical history        VITALS:  T(F): 97.6, Max: 97.8 (06-25-18 @ 13:34)  HR: 84  BP: 155/67  RR: 18Vital Signs Last 24 Hrs  T(C): 36.4 (26 Jun 2018 04:34), Max: 36.6 (25 Jun 2018 13:34)  T(F): 97.6 (26 Jun 2018 04:34), Max: 97.8 (25 Jun 2018 13:34)  HR: 84 (26 Jun 2018 04:34) (64 - 84)  BP: 155/67 (26 Jun 2018 04:34) (95/59 - 155/67)  BP(mean): --  RR: 18 (26 Jun 2018 04:34) (17 - 18)  SpO2: 97% (25 Jun 2018 20:05) (95% - 97%)    TESTS & MEASUREMENTS:                        10.5   14.55 )-----------( 258      ( 25 Jun 2018 15:29 )             32.6     06-25    134<L>  |  96<L>  |  23<H>  ----------------------------<  201<H>  4.5   |  23  |  1.1    Ca    9.2      25 Jun 2018 15:29    TPro  6.6  /  Alb  3.1<L>  /  TBili  <0.2  /  DBili  <0.2  /  AST  23  /  ALT  11  /  AlkPhos  97  06-25    LIVER FUNCTIONS - ( 25 Jun 2018 15:29 )  Alb: 3.1 g/dL / Pro: 6.6 g/dL / ALK PHOS: 97 U/L / ALT: 11 U/L / AST: 23 U/L / GGT: x                   RADIOLOGY & ADDITIONAL TESTS:    ANTIBIOTICS:  ampicillin/sulbactam  IVPB 1.5 Gram(s) IV Intermittent every 6 hours  vancomycin  IVPB 1000 milliGRAM(s) IV Intermittent every 12 hours

## 2018-06-27 LAB
ANION GAP SERPL CALC-SCNC: 16 MMOL/L — HIGH (ref 7–14)
BUN SERPL-MCNC: 16 MG/DL — SIGNIFICANT CHANGE UP (ref 10–20)
CALCIUM SERPL-MCNC: 9.8 MG/DL — SIGNIFICANT CHANGE UP (ref 8.5–10.1)
CHLORIDE SERPL-SCNC: 100 MMOL/L — SIGNIFICANT CHANGE UP (ref 98–110)
CO2 SERPL-SCNC: 25 MMOL/L — SIGNIFICANT CHANGE UP (ref 17–32)
CREAT SERPL-MCNC: 0.6 MG/DL — LOW (ref 0.7–1.5)
ESTIMATED AVERAGE GLUCOSE: 235 MG/DL — HIGH (ref 68–114)
GLUCOSE SERPL-MCNC: 102 MG/DL — HIGH (ref 70–99)
HBA1C BLD-MCNC: 9.8 % — HIGH (ref 4–5.6)
HCT VFR BLD CALC: 32.8 % — LOW (ref 37–47)
HGB BLD-MCNC: 10.5 G/DL — LOW (ref 12–16)
MCHC RBC-ENTMCNC: 26.3 PG — LOW (ref 27–31)
MCHC RBC-ENTMCNC: 32 G/DL — SIGNIFICANT CHANGE UP (ref 32–37)
MCV RBC AUTO: 82.2 FL — SIGNIFICANT CHANGE UP (ref 81–99)
NRBC # BLD: 0 /100 WBCS — SIGNIFICANT CHANGE UP (ref 0–0)
PLATELET # BLD AUTO: 239 K/UL — SIGNIFICANT CHANGE UP (ref 130–400)
POTASSIUM SERPL-MCNC: 3.8 MMOL/L — SIGNIFICANT CHANGE UP (ref 3.5–5)
POTASSIUM SERPL-SCNC: 3.8 MMOL/L — SIGNIFICANT CHANGE UP (ref 3.5–5)
RBC # BLD: 3.99 M/UL — LOW (ref 4.2–5.4)
RBC # FLD: 19.5 % — HIGH (ref 11.5–14.5)
SODIUM SERPL-SCNC: 141 MMOL/L — SIGNIFICANT CHANGE UP (ref 135–146)
WBC # BLD: 9.73 K/UL — SIGNIFICANT CHANGE UP (ref 4.8–10.8)
WBC # FLD AUTO: 9.73 K/UL — SIGNIFICANT CHANGE UP (ref 4.8–10.8)

## 2018-06-27 RX ADMIN — AMPICILLIN SODIUM AND SULBACTAM SODIUM 200 GRAM(S): 250; 125 INJECTION, POWDER, FOR SUSPENSION INTRAMUSCULAR; INTRAVENOUS at 05:27

## 2018-06-27 RX ADMIN — Medication 250 MILLIGRAM(S): at 05:27

## 2018-06-27 RX ADMIN — Medication 1 PATCH: at 12:27

## 2018-06-27 RX ADMIN — AMPICILLIN SODIUM AND SULBACTAM SODIUM 200 GRAM(S): 250; 125 INJECTION, POWDER, FOR SUSPENSION INTRAMUSCULAR; INTRAVENOUS at 23:49

## 2018-06-27 RX ADMIN — MORPHINE SULFATE 4 MILLIGRAM(S): 50 CAPSULE, EXTENDED RELEASE ORAL at 21:58

## 2018-06-27 RX ADMIN — INSULIN GLARGINE 20 UNIT(S): 100 INJECTION, SOLUTION SUBCUTANEOUS at 21:59

## 2018-06-27 RX ADMIN — MORPHINE SULFATE 4 MILLIGRAM(S): 50 CAPSULE, EXTENDED RELEASE ORAL at 00:29

## 2018-06-27 RX ADMIN — MORPHINE SULFATE 4 MILLIGRAM(S): 50 CAPSULE, EXTENDED RELEASE ORAL at 17:11

## 2018-06-27 RX ADMIN — AMPICILLIN SODIUM AND SULBACTAM SODIUM 200 GRAM(S): 250; 125 INJECTION, POWDER, FOR SUSPENSION INTRAMUSCULAR; INTRAVENOUS at 12:27

## 2018-06-27 RX ADMIN — MORPHINE SULFATE 4 MILLIGRAM(S): 50 CAPSULE, EXTENDED RELEASE ORAL at 07:37

## 2018-06-27 RX ADMIN — Medication 7 UNIT(S): at 12:28

## 2018-06-27 RX ADMIN — AMPICILLIN SODIUM AND SULBACTAM SODIUM 200 GRAM(S): 250; 125 INJECTION, POWDER, FOR SUSPENSION INTRAMUSCULAR; INTRAVENOUS at 18:00

## 2018-06-27 RX ADMIN — Medication 20 MILLIGRAM(S): at 05:27

## 2018-06-27 RX ADMIN — Medication 2: at 12:28

## 2018-06-27 RX ADMIN — Medication 250 MILLIGRAM(S): at 19:17

## 2018-06-27 NOTE — PROGRESS NOTE ADULT - SUBJECTIVE AND OBJECTIVE BOX
Podiatry Follow Up  Pt seen and assessed on AM rounds.    Pt denies F/N/V/C/D/SOB at this time.   Pt endorses pain greater in Right foot.    Vital Signs Last 24 Hrs  T(C): 37.1 (27 Jun 2018 04:56), Max: 37.1 (27 Jun 2018 04:56)  T(F): 98.8 (27 Jun 2018 04:56), Max: 98.8 (27 Jun 2018 04:56)  HR: 84 (27 Jun 2018 04:56) (66 - 84)  BP: 133/58 (27 Jun 2018 04:56) (121/58 - 183/79)  BP(mean): --  RR: 18 (27 Jun 2018 04:56) (18 - 18)  SpO2: --                        10.5   9.73  )-----------( 239      ( 27 Jun 2018 06:13 )             32.8   06-27    141  |  100  |  16  ----------------------------<  102<H>  3.8   |  25  |  0.6<L>    Ca    9.8      27 Jun 2018 06:13    TPro  6.6  /  Alb  3.1<L>  /  TBili  <0.2  /  DBili  <0.2  /  AST  23  /  ALT  11  /  AlkPhos  97  06-25    A:  Right foot necrotic changes to 2nd digit and ulcerations Right 3rd with malodor    P:  Podiatry to follow up q24h  Pt for OR with Podiatry on FRIDAY 6/29/2018  Procedure: EX DBX ST/B Right foot possible TMA

## 2018-06-27 NOTE — PROGRESS NOTE ADULT - SUBJECTIVE AND OBJECTIVE BOX
PGY I NOTE    LENGTH OF HOSPITAL STAY:  2d    CHIEF COMPLAINT:Patient is a 63y old  Female who presents with a chief complaint of DFU R foot (25 Jun 2018 21:57)    HPI:HPI:  64 y/o Female smoker  w/ history of PVD, HTN, COPD not on home oxygen ,ELEUTERIO not on cpap , DM-2(unsure about history)  sent in from NH for necrotic Right 2nd digit , referred by her Podiatrist Dr. Tobias.  As per patient she has pain right  foot worsening x 1 week partially relieved by percocet. Denies fever, chills, nausea , vomiting or any constitutional symptoms. As per patient she is being sent in from for IV antibiotics and further evaluation for possible debridement of right toe.    In ED rX ROCEPHIN 2 G, VANCOMYCIN AND PERCOCET (25 Jun 2018 18:48)    OVERNIGHT EVENTS/UPDATES: no acute event overnight    PMH & PSH  PAST MEDICAL & SURGICAL HISTORY:  Hypertension  Psychiatric disorder  Depression  COPD (chronic obstructive pulmonary disease)  Diabetes  No significant past surgical history    SOCIAL HISTORY: Negative    ALLERGIES: No Known Allergies    HOME MEDICATIONS  Home Medications:  ANORO ELLIPTA AEROSOL POWDER BREATH ACTIVATED:  (25 Jun 2018 20:16)  ATORVASTATIN CALCIUM 40MG TABLET:  (25 Jun 2018 20:16)  CLONAZEPAM .5MG TABLET:  (25 Jun 2018 20:16)  DULOXETINE HCL 30MG CAPSULE DELAYED RELEASE PARTICLES:  (25 Jun 2018 20:16)  ENALAPRIL MALEATE 10MG TABLET:  (25 Jun 2018 20:16)  FUROSEMIDE 20MG TABLET:  (25 Jun 2018 20:16)  GABAPENTIN 100MG CAPSULE:  (25 Jun 2018 20:16)  IPRATROPIUM BROMIDE/ALBUTEROL SULFATE SOLUTION:  (25 Jun 2018 20:16)  OXYCODONE/ACETAMINOPHEN TABLET:  (25 Jun 2018 20:16)    PHYSICAL EXAM:  T(F): 98.8 (06-27-18 @ 04:56), Max: 98.8 (06-27-18 @ 04:56)  HR: 84 (06-27-18 @ 04:56)  BP: 133/58 (06-27-18 @ 04:56)  RR: 18 (06-27-18 @ 04:56)  SpO2: --  CAPILLARY BLOOD GLUCOSE  99 (27 Jun 2018 04:56)  173 (26 Jun 2018 21:47)  158 (26 Jun 2018 16:12)  193 (26 Jun 2018 11:15)        I&O's Summary    26 Jun 2018 07:01  -  27 Jun 2018 07:00  --------------------------------------------------------  IN: 350 mL / OUT: 5 mL / NET: 345 mL      General: NAD  HEENT: NCAT, no JVD  CV: RRR  RESP: CTAB  Abdominal: Soft, NTTP, non-distended  Extremity: no c/c/e  Neuro: A&O x3, non-focal    MEDICATIONS  STANDING MEDICATIONS  ampicillin/sulbactam  IVPB      ampicillin/sulbactam  IVPB 3 Gram(s) IV Intermittent every 6 hours  dextrose 5%. 1000 milliLiter(s) IV Continuous <Continuous>  dextrose 50% Injectable 12.5 Gram(s) IV Push once  dextrose 50% Injectable 25 Gram(s) IV Push once  dextrose 50% Injectable 25 Gram(s) IV Push once  enoxaparin Injectable 40 milliGRAM(s) SubCutaneous daily  furosemide    Tablet 20 milliGRAM(s) Oral daily  insulin glargine Injectable (LANTUS) 20 Unit(s) SubCutaneous at bedtime  insulin lispro (HumaLOG) corrective regimen sliding scale   SubCutaneous three times a day before meals  insulin lispro Injectable (HumaLOG) 7 Unit(s) SubCutaneous three times a day before meals  nicotine -  14 mG/24Hr(s) Patch 1 patch Transdermal daily  vancomycin  IVPB 1000 milliGRAM(s) IV Intermittent every 12 hours    PRN MEDICATIONS  dextrose 40% Gel 15 Gram(s) Oral once PRN  glucagon  Injectable 1 milliGRAM(s) IntraMuscular once PRN  morphine  - Injectable 4 milliGRAM(s) IV Push every 4 hours PRN    LABS:                        10.5   9.73  )-----------( 239      ( 27 Jun 2018 06:13 )             32.8              06-27    141  |  100  |  16  ----------------------------<  102<H>  3.8   |  25  |  0.6<L>    Ca    9.8      27 Jun 2018 06:13    TPro  6.6  /  Alb  3.1<L>  /  TBili  <0.2  /  DBili  <0.2  /  AST  23  /  ALT  11  /  AlkPhos  97  06-25    LIVER FUNCTIONS - ( 25 Jun 2018 15:29 )  Alb: 3.1 g/dL / Pro: 6.6 g/dL / ALK PHOS: 97 U/L / ALT: 11 U/L / AST: 23 U/L / GGT: x                      PT/INR - ( 25 Jun 2018 15:29 )   PT: 13.00 sec;   INR: 1.21 ratio         PTT - ( 25 Jun 2018 15:29 )  PTT:32.2 sec    Culture - Blood (collected 25 Jun 2018 16:46)  Source: .Blood Blood-Venous  Preliminary Report (27 Jun 2018 01:05):    No growth to date.

## 2018-06-27 NOTE — PROGRESS NOTE ADULT - SUBJECTIVE AND OBJECTIVE BOX
GABY ASHLEY  63y, Female      OVERNIGHT EVENTS:    none    VITALS:  T(F): 98.8, Max: 98.8 (06-27-18 @ 04:56)  HR: 84  BP: 133/58  RR: 18Vital Signs Last 24 Hrs  T(C): 37.1 (27 Jun 2018 04:56), Max: 37.1 (27 Jun 2018 04:56)  T(F): 98.8 (27 Jun 2018 04:56), Max: 98.8 (27 Jun 2018 04:56)  HR: 84 (27 Jun 2018 04:56) (66 - 84)  BP: 133/58 (27 Jun 2018 04:56) (121/58 - 183/79)  BP(mean): --  RR: 18 (27 Jun 2018 04:56) (18 - 18)  SpO2: --    TESTS & MEASUREMENTS:                        10.5   9.73  )-----------( 239      ( 27 Jun 2018 06:13 )             32.8     06-27    141  |  100  |  16  ----------------------------<  102<H>  3.8   |  25  |  0.6<L>    Ca    9.8      27 Jun 2018 06:13    TPro  6.6  /  Alb  3.1<L>  /  TBili  <0.2  /  DBili  <0.2  /  AST  23  /  ALT  11  /  AlkPhos  97  06-25    LIVER FUNCTIONS - ( 25 Jun 2018 15:29 )  Alb: 3.1 g/dL / Pro: 6.6 g/dL / ALK PHOS: 97 U/L / ALT: 11 U/L / AST: 23 U/L / GGT: x             Culture - Blood (collected 06-25-18 @ 16:46)  Source: .Blood Blood-Venous  Preliminary Report (06-27-18 @ 01:05):    No growth to date.            RADIOLOGY & ADDITIONAL TESTS:    ANTIBIOTICS:  ampicillin/sulbactam  IVPB      ampicillin/sulbactam  IVPB 3 Gram(s) IV Intermittent every 6 hours  vancomycin  IVPB 1000 milliGRAM(s) IV Intermittent every 12 hours

## 2018-06-27 NOTE — PROGRESS NOTE ADULT - ASSESSMENT
62 y/o female with h/o PAD, COPD not on home oxygen, ELEUTERIO not on CPAP, DM-2, HTN, psychiatric disorder sent in from NH as per podiatry referral for evaluation and treatment of Right foot gangrene and worsening pain    #Gangrene 2nd right toe with osteomyelitis  -XR shows osteo  -TMA Friday  -low risk patient for low risk surgery  -podiatry following  -pain control w/ morphine  -blood culture NTD  -c/w vanco and unasyn per ID    #PAD  -Lasix PO 20 daily  -vascular following, will get arterial duplex  -will also get venous duplex to r/o DVT given swelling    #HTN, controlled  -DASH diet    #COPD, stable  -f/u outpatient    #DM  -ISS and fingerstick monitoring  -a1c 9.9    #DVT ppx  -Lovenox    #Code status  -DNR/DNI    #Dispo  -from NH, will go to NH 62 y/o female with h/o PAD, COPD not on home oxygen, ELEUTERIO not on CPAP, DM-2, HTN, psychiatric disorder sent in from NH as per podiatry referral for evaluation and treatment of Right foot gangrene and worsening pain    #Gangrene 2nd right toe with osteomyelitis  -XR shows osteo  -TMA Friday  -podiatry following  -pain control w/ morphine  -blood culture NTD  -c/w vanco and unasyn per ID    #PAD  -Lasix PO 20 daily  -vascular following, will get arterial duplex  -will also get venous duplex to r/o DVT given swelling    #HTN, controlled  -DASH diet    #COPD, stable  -f/u outpatient    #DM  -ISS and fingerstick monitoring  -a1c 9.9    #DVT ppx  -Lovenox    #Code status  -DNR/DNI    #Dispo  -from NH, will go to NH

## 2018-06-27 NOTE — PROGRESS NOTE ADULT - ASSESSMENT
IMPRESSION:  Wet gangrene of 2-3rd toe on the right foot with possible forefoot involvement.  Left foot with possible   septic arthritis of 3rd mid phalanx.  Blood cultures are negative.  Significant PAD.    RECOMMENDATIONS:  Amputation planned.  Vancomycin 1 gm iv q12h  Unasyn 3 gm iv q6h

## 2018-06-28 LAB
ANION GAP SERPL CALC-SCNC: 16 MMOL/L — HIGH (ref 7–14)
ANION GAP SERPL CALC-SCNC: 17 MMOL/L — HIGH (ref 7–14)
APTT BLD: 34 SEC — SIGNIFICANT CHANGE UP (ref 27–39.2)
APTT BLD: 34.3 SEC — SIGNIFICANT CHANGE UP (ref 27–39.2)
BLD GP AB SCN SERPL QL: SIGNIFICANT CHANGE UP
BUN SERPL-MCNC: 12 MG/DL — SIGNIFICANT CHANGE UP (ref 10–20)
BUN SERPL-MCNC: 14 MG/DL — SIGNIFICANT CHANGE UP (ref 10–20)
CALCIUM SERPL-MCNC: 10 MG/DL — SIGNIFICANT CHANGE UP (ref 8.5–10.1)
CALCIUM SERPL-MCNC: 9.8 MG/DL — SIGNIFICANT CHANGE UP (ref 8.5–10.1)
CHLORIDE SERPL-SCNC: 95 MMOL/L — LOW (ref 98–110)
CHLORIDE SERPL-SCNC: 97 MMOL/L — LOW (ref 98–110)
CO2 SERPL-SCNC: 25 MMOL/L — SIGNIFICANT CHANGE UP (ref 17–32)
CO2 SERPL-SCNC: 25 MMOL/L — SIGNIFICANT CHANGE UP (ref 17–32)
CREAT SERPL-MCNC: 0.5 MG/DL — LOW (ref 0.7–1.5)
CREAT SERPL-MCNC: 0.7 MG/DL — SIGNIFICANT CHANGE UP (ref 0.7–1.5)
GLUCOSE SERPL-MCNC: 187 MG/DL — HIGH (ref 70–99)
GLUCOSE SERPL-MCNC: 214 MG/DL — HIGH (ref 70–99)
HCT VFR BLD CALC: 32.4 % — LOW (ref 37–47)
HCT VFR BLD CALC: 33.8 % — LOW (ref 37–47)
HGB BLD-MCNC: 10.4 G/DL — LOW (ref 12–16)
HGB BLD-MCNC: 10.7 G/DL — LOW (ref 12–16)
INR BLD: 1.18 RATIO — SIGNIFICANT CHANGE UP (ref 0.65–1.3)
INR BLD: 1.18 RATIO — SIGNIFICANT CHANGE UP (ref 0.65–1.3)
MCHC RBC-ENTMCNC: 26.3 PG — LOW (ref 27–31)
MCHC RBC-ENTMCNC: 26.5 PG — LOW (ref 27–31)
MCHC RBC-ENTMCNC: 31.7 G/DL — LOW (ref 32–37)
MCHC RBC-ENTMCNC: 32.1 G/DL — SIGNIFICANT CHANGE UP (ref 32–37)
MCV RBC AUTO: 82 FL — SIGNIFICANT CHANGE UP (ref 81–99)
MCV RBC AUTO: 83.7 FL — SIGNIFICANT CHANGE UP (ref 81–99)
NRBC # BLD: 0 /100 WBCS — SIGNIFICANT CHANGE UP (ref 0–0)
NRBC # BLD: 0 /100 WBCS — SIGNIFICANT CHANGE UP (ref 0–0)
PLATELET # BLD AUTO: 271 K/UL — SIGNIFICANT CHANGE UP (ref 130–400)
PLATELET # BLD AUTO: 280 K/UL — SIGNIFICANT CHANGE UP (ref 130–400)
POTASSIUM SERPL-MCNC: 4.2 MMOL/L — SIGNIFICANT CHANGE UP (ref 3.5–5)
POTASSIUM SERPL-MCNC: 4.3 MMOL/L — SIGNIFICANT CHANGE UP (ref 3.5–5)
POTASSIUM SERPL-SCNC: 4.2 MMOL/L — SIGNIFICANT CHANGE UP (ref 3.5–5)
POTASSIUM SERPL-SCNC: 4.3 MMOL/L — SIGNIFICANT CHANGE UP (ref 3.5–5)
PROTHROM AB SERPL-ACNC: 12.7 SEC — SIGNIFICANT CHANGE UP (ref 9.95–12.87)
PROTHROM AB SERPL-ACNC: 12.7 SEC — SIGNIFICANT CHANGE UP (ref 9.95–12.87)
RBC # BLD: 3.95 M/UL — LOW (ref 4.2–5.4)
RBC # BLD: 4.04 M/UL — LOW (ref 4.2–5.4)
RBC # FLD: 19.9 % — HIGH (ref 11.5–14.5)
RBC # FLD: 20.3 % — HIGH (ref 11.5–14.5)
SODIUM SERPL-SCNC: 137 MMOL/L — SIGNIFICANT CHANGE UP (ref 135–146)
SODIUM SERPL-SCNC: 138 MMOL/L — SIGNIFICANT CHANGE UP (ref 135–146)
TYPE + AB SCN PNL BLD: SIGNIFICANT CHANGE UP
TYPE + AB SCN PNL BLD: SIGNIFICANT CHANGE UP
WBC # BLD: 10.42 K/UL — SIGNIFICANT CHANGE UP (ref 4.8–10.8)
WBC # BLD: 12.19 K/UL — HIGH (ref 4.8–10.8)
WBC # FLD AUTO: 10.42 K/UL — SIGNIFICANT CHANGE UP (ref 4.8–10.8)
WBC # FLD AUTO: 12.19 K/UL — HIGH (ref 4.8–10.8)

## 2018-06-28 RX ORDER — MAGNESIUM HYDROXIDE 400 MG/1
30 TABLET, CHEWABLE ORAL DAILY
Qty: 0 | Refills: 0 | Status: DISCONTINUED | OUTPATIENT
Start: 2018-06-28 | End: 2018-06-29

## 2018-06-28 RX ORDER — ALPRAZOLAM 0.25 MG
0.25 TABLET ORAL ONCE
Qty: 0 | Refills: 0 | Status: DISCONTINUED | OUTPATIENT
Start: 2018-06-28 | End: 2018-06-28

## 2018-06-28 RX ADMIN — Medication 7 UNIT(S): at 11:36

## 2018-06-28 RX ADMIN — INSULIN GLARGINE 20 UNIT(S): 100 INJECTION, SOLUTION SUBCUTANEOUS at 21:21

## 2018-06-28 RX ADMIN — Medication 2: at 11:36

## 2018-06-28 RX ADMIN — AMPICILLIN SODIUM AND SULBACTAM SODIUM 200 GRAM(S): 250; 125 INJECTION, POWDER, FOR SUSPENSION INTRAMUSCULAR; INTRAVENOUS at 17:22

## 2018-06-28 RX ADMIN — Medication 7 UNIT(S): at 07:39

## 2018-06-28 RX ADMIN — AMPICILLIN SODIUM AND SULBACTAM SODIUM 200 GRAM(S): 250; 125 INJECTION, POWDER, FOR SUSPENSION INTRAMUSCULAR; INTRAVENOUS at 05:31

## 2018-06-28 RX ADMIN — Medication 1 PATCH: at 11:36

## 2018-06-28 RX ADMIN — Medication 1: at 07:39

## 2018-06-28 RX ADMIN — MAGNESIUM HYDROXIDE 30 MILLILITER(S): 400 TABLET, CHEWABLE ORAL at 19:17

## 2018-06-28 RX ADMIN — MORPHINE SULFATE 4 MILLIGRAM(S): 50 CAPSULE, EXTENDED RELEASE ORAL at 07:44

## 2018-06-28 RX ADMIN — AMPICILLIN SODIUM AND SULBACTAM SODIUM 200 GRAM(S): 250; 125 INJECTION, POWDER, FOR SUSPENSION INTRAMUSCULAR; INTRAVENOUS at 11:37

## 2018-06-28 RX ADMIN — Medication 250 MILLIGRAM(S): at 05:26

## 2018-06-28 RX ADMIN — Medication 7 UNIT(S): at 16:32

## 2018-06-28 RX ADMIN — MORPHINE SULFATE 4 MILLIGRAM(S): 50 CAPSULE, EXTENDED RELEASE ORAL at 16:10

## 2018-06-28 RX ADMIN — MORPHINE SULFATE 4 MILLIGRAM(S): 50 CAPSULE, EXTENDED RELEASE ORAL at 15:49

## 2018-06-28 RX ADMIN — Medication 1: at 16:32

## 2018-06-28 RX ADMIN — Medication 250 MILLIGRAM(S): at 19:18

## 2018-06-28 RX ADMIN — MORPHINE SULFATE 4 MILLIGRAM(S): 50 CAPSULE, EXTENDED RELEASE ORAL at 02:42

## 2018-06-28 RX ADMIN — Medication 0.25 MILLIGRAM(S): at 09:54

## 2018-06-28 RX ADMIN — MORPHINE SULFATE 4 MILLIGRAM(S): 50 CAPSULE, EXTENDED RELEASE ORAL at 21:21

## 2018-06-28 NOTE — PROGRESS NOTE ADULT - ASSESSMENT
64 y/o female with h/o PAD, COPD not on home oxygen, ELEUTERIO not on CPAP, DM-2, HTN, psychiatric disorder sent in from NH as per podiatry referral for evaluation and treatment of Right foot gangrene and worsening pain    #Gangrene 2nd right toe with osteomyelitis  -XR shows osteo  -TMA Friday  -podiatry following  -pain control w/ morphine  -blood culture NTD  -c/w vanco and unasyn per ID    #PAD  -Lasix PO 20 daily  -vascular following, arterial duplex unremarkable  -venous duplex negative    #HTN, controlled  -DASH diet    #COPD, stable  -f/u outpatient    #DM  -ISS and fingerstick monitoring  -a1c 9.9, need to follow up with PMD    #DVT ppx  -Lovenox    #Code status  -DNR/DNI    #Dispo  -from NH, will go to NH 62 y/o female with h/o PAD, COPD not on home oxygen, ELEUTERIO not on CPAP, DM-2, HTN, psychiatric disorder sent in from NH as per podiatry referral for evaluation and treatment of Right foot gangrene and worsening pain    #Gangrene 2nd right toe with osteomyelitis  -XR shows osteo  -TMA Friday  -podiatry following  -pain control w/ morphine  -blood culture NTD  -c/w vanco and unasyn per ID  -need clearance    #PAD  -Lasix PO 20 daily  -vascular following, arterial duplex unremarkable  -venous duplex negative    #HTN, controlled  -DASH diet    #COPD, stable  -f/u outpatient    #DM  -ISS and fingerstick monitoring  -a1c 9.9, need to follow up with PMD    #DVT ppx  -Lovenox    #Code status  -DNR/DNI    #Dispo  -from NH, will go to NH

## 2018-06-28 NOTE — PROGRESS NOTE ADULT - SUBJECTIVE AND OBJECTIVE BOX
Podiatry Follow Up  Pt seen and assessed on AM rounds.    Pt denies F/N/V/C/D/SOB at this time.   Pt endorses pain greater in Right foot.    Vital Signs Last 24 Hrs  T(C): 36.4 (28 Jun 2018 04:43), Max: 36.9 (27 Jun 2018 13:25)  T(F): 97.5 (28 Jun 2018 04:43), Max: 98.4 (27 Jun 2018 13:25)  HR: 78 (28 Jun 2018 04:43) (72 - 78)  BP: 129/59 (28 Jun 2018 04:43) (129/59 - 135/61)  RR: 18 (28 Jun 2018 04:43) (18 - 19)                             10.4   10.42 )-----------( 271      ( 28 Jun 2018 05:04 )                  32.4     06-28  138  |  97<L>  |  12  ----------------------------<  187<H>  4.2   |  25  |  0.5<L>  Ca    10.0      28 Jun 2018 05:04        A:  Right foot necrotic changes to 2nd digit and ulcerations Right 3rd with malodor    P:  Podiatry to follow up q24h  Pt for OR with Podiatry on FRIDAY 6/29/2018  Procedure: EX DBX ST/B Right foot possible TMA   Consent: signed   Vascular note appreciated and discussed with Attending Dr. Los Tobias.  NPO MN  Labs ordered and will follow. Podiatry Follow Up  Pt seen and assessed on AM rounds.    Pt denies F/N/V/C/D/SOB at this time.   Pt endorses pain greater in Right foot.    Vital Signs Last 24 Hrs  T(C): 36.4 (28 Jun 2018 04:43), Max: 36.9 (27 Jun 2018 13:25)  T(F): 97.5 (28 Jun 2018 04:43), Max: 98.4 (27 Jun 2018 13:25)  HR: 78 (28 Jun 2018 04:43) (72 - 78)  BP: 129/59 (28 Jun 2018 04:43) (129/59 - 135/61)  RR: 18 (28 Jun 2018 04:43) (18 - 19)                             10.4   10.42 )-----------( 271      ( 28 Jun 2018 05:04 )                  32.4     06-28  138  |  97<L>  |  12  ----------------------------<  187<H>  4.2   |  25  |  0.5<L>  Ca    10.0      28 Jun 2018 05:04        A:  Right foot necrotic changes to 2nd digit and ulcerations Right 3rd with malodor    P:  Podiatry to follow up q24h  Pt for OR with Podiatry on FRIDAY 6/29/2018  Procedure: EX DBX ST/B Right foot possible TMA   Consent: signed   Medical clearance needed for OR on Friday   Vascular note appreciated and discussed with Attending Dr. Los [place].  NPO MN  Labs ordered and will follow.

## 2018-06-28 NOTE — PROGRESS NOTE ADULT - SUBJECTIVE AND OBJECTIVE BOX
GABY ASHLEY  63y, Female      OVERNIGHT EVENTS:    no fevers.    VITALS:  T(F): 97.5, Max: 98.4 (06-27-18 @ 13:25)  HR: 78  BP: 129/59  RR: 18Vital Signs Last 24 Hrs  T(C): 36.4 (28 Jun 2018 04:43), Max: 36.9 (27 Jun 2018 13:25)  T(F): 97.5 (28 Jun 2018 04:43), Max: 98.4 (27 Jun 2018 13:25)  HR: 78 (28 Jun 2018 04:43) (72 - 78)  BP: 129/59 (28 Jun 2018 04:43) (129/59 - 135/61)  BP(mean): --  RR: 18 (28 Jun 2018 04:43) (18 - 19)  SpO2: --    TESTS & MEASUREMENTS:                        10.4   10.42 )-----------( 271      ( 28 Jun 2018 05:04 )             32.4     06-28    138  |  97<L>  |  12  ----------------------------<  187<H>  4.2   |  25  |  0.5<L>    Ca    10.0      28 Jun 2018 05:04          Culture - Blood (collected 06-26-18 @ 07:15)  Source: .Blood None  Preliminary Report (06-27-18 @ 17:02):    No growth to date.    Culture - Blood (collected 06-25-18 @ 16:46)  Source: .Blood Blood-Venous  Preliminary Report (06-27-18 @ 01:05):    No growth to date.            RADIOLOGY & ADDITIONAL TESTS:    ANTIBIOTICS:  ampicillin/sulbactam  IVPB      ampicillin/sulbactam  IVPB 3 Gram(s) IV Intermittent every 6 hours  vancomycin  IVPB 1000 milliGRAM(s) IV Intermittent every 12 hours

## 2018-06-28 NOTE — PROGRESS NOTE ADULT - SUBJECTIVE AND OBJECTIVE BOX
PGY I NOTE    LENGTH OF HOSPITAL STAY:  3d    CHIEF COMPLAINT:Patient is a 63y old  Female who presents with a chief complaint of DFU R foot (25 Jun 2018 21:57)    HPI:HPI:  62 y/o Female smoker  w/ history of PVD, HTN, COPD not on home oxygen ,ELEUTERIO not on cpap , DM-2(unsure about history)  sent in from NH for necrotic Right 2nd digit , referred by her Podiatrist Dr. Tobias.  As per patient she has pain right  foot worsening x 1 week partially relieved by percocet. Denies fever, chills, nausea , vomiting or any constitutional symptoms. As per patient she is being sent in from for IV antibiotics and further evaluation for possible debridement of right toe.    In ED rX ROCEPHIN 2 G, VANCOMYCIN AND PERCOCET (25 Jun 2018 18:48)    OVERNIGHT EVENTS/UPDATES: patient feeling anxious d/t pending surgery    PMH & PSH  PAST MEDICAL & SURGICAL HISTORY:  Hypertension  Psychiatric disorder  Depression  COPD (chronic obstructive pulmonary disease)  Diabetes  No significant past surgical history    SOCIAL HISTORY: Negative    ALLERGIES: No Known Allergies    HOME MEDICATIONS  Home Medications:  ANORO ELLIPTA AEROSOL POWDER BREATH ACTIVATED:  (25 Jun 2018 20:16)  ATORVASTATIN CALCIUM 40MG TABLET:  (25 Jun 2018 20:16)  CLONAZEPAM .5MG TABLET:  (25 Jun 2018 20:16)  DULOXETINE HCL 30MG CAPSULE DELAYED RELEASE PARTICLES:  (25 Jun 2018 20:16)  ENALAPRIL MALEATE 10MG TABLET:  (25 Jun 2018 20:16)  FUROSEMIDE 20MG TABLET:  (25 Jun 2018 20:16)  GABAPENTIN 100MG CAPSULE:  (25 Jun 2018 20:16)  IPRATROPIUM BROMIDE/ALBUTEROL SULFATE SOLUTION:  (25 Jun 2018 20:16)  OXYCODONE/ACETAMINOPHEN TABLET:  (25 Jun 2018 20:16)    PHYSICAL EXAM:  T(F): 97.5 (06-28-18 @ 04:43), Max: 98.4 (06-27-18 @ 13:25)  HR: 78 (06-28-18 @ 04:43)  BP: 129/59 (06-28-18 @ 04:43)  RR: 18 (06-28-18 @ 04:43)  SpO2: --  CAPILLARY BLOOD GLUCOSE  200 (28 Jun 2018 06:03)  211 (27 Jun 2018 13:25)  205 (27 Jun 2018 12:16)        I&O's Summary    General: NAD  HEENT: NCAT, no JVD  CV: RRR  RESP: CTAB  Abdominal: Soft, NTTP, non-distended  Extremity: right leg edematous  Neuro: A&O x3, non-focal    MEDICATIONS  STANDING MEDICATIONS  ALPRAZolam 0.25 milliGRAM(s) Oral once  ampicillin/sulbactam  IVPB      ampicillin/sulbactam  IVPB 3 Gram(s) IV Intermittent every 6 hours  dextrose 5%. 1000 milliLiter(s) IV Continuous <Continuous>  dextrose 50% Injectable 12.5 Gram(s) IV Push once  dextrose 50% Injectable 25 Gram(s) IV Push once  dextrose 50% Injectable 25 Gram(s) IV Push once  enoxaparin Injectable 40 milliGRAM(s) SubCutaneous daily  furosemide    Tablet 20 milliGRAM(s) Oral daily  insulin glargine Injectable (LANTUS) 20 Unit(s) SubCutaneous at bedtime  insulin lispro (HumaLOG) corrective regimen sliding scale   SubCutaneous three times a day before meals  insulin lispro Injectable (HumaLOG) 7 Unit(s) SubCutaneous three times a day before meals  nicotine -  14 mG/24Hr(s) Patch 1 patch Transdermal daily  vancomycin  IVPB 1000 milliGRAM(s) IV Intermittent every 12 hours    PRN MEDICATIONS  dextrose 40% Gel 15 Gram(s) Oral once PRN  glucagon  Injectable 1 milliGRAM(s) IntraMuscular once PRN  morphine  - Injectable 4 milliGRAM(s) IV Push every 4 hours PRN    LABS:                        10.4   10.42 )-----------( 271      ( 28 Jun 2018 05:04 )             32.4              06-28    138  |  97<L>  |  12  ----------------------------<  187<H>  4.2   |  25  |  0.5<L>    Ca    10.0      28 Jun 2018 05:04      Culture - Blood (collected 26 Jun 2018 07:15)  Source: .Blood None  Preliminary Report (27 Jun 2018 17:02):    No growth to date.    Culture - Blood (collected 25 Jun 2018 16:46)  Source: .Blood Blood-Venous  Preliminary Report (27 Jun 2018 01:05):    No growth to date.

## 2018-06-28 NOTE — PROGRESS NOTE ADULT - ASSESSMENT
IMPRESSION:  Wet gangrene of 2-3rd toe on the right foot with possible forefoot involvement.  Left foot with possible septic arthritis of 3rd mid phalanx.  Blood cultures are negative.  Significant PAD.    RECOMMENDATIONS:  Amputation planned.  Vancomycin 1 gm iv q12h  Unasyn 3 gm iv q6h.  Vanco level please.

## 2018-06-28 NOTE — PROGRESS NOTE ADULT - SUBJECTIVE AND OBJECTIVE BOX
Podiatry Pre-Operative Note   [id number] GABY ASHLEY is a 63y year old Female patient presenting to the operating room for surgical management of the Right foot. The patient has failed all conservative measures and requires surgical intervention at this time.    PAST MEDICAL & SURGICAL HISTORY:  Hypertension  Psychiatric disorder  Depression  COPD (chronic obstructive pulmonary disease)  Diabetes  No significant past surgical history      Medications:   ampicillin/sulbactam  IVPB      ampicillin/sulbactam  IVPB 3 Gram(s) IV Intermittent every 6 hours  dextrose 40% Gel 15 Gram(s) Oral once PRN  dextrose 5%. 1000 milliLiter(s) IV Continuous <Continuous>  dextrose 50% Injectable 12.5 Gram(s) IV Push once  dextrose 50% Injectable 25 Gram(s) IV Push once  dextrose 50% Injectable 25 Gram(s) IV Push once  enoxaparin Injectable 40 milliGRAM(s) SubCutaneous daily  furosemide    Tablet 20 milliGRAM(s) Oral daily  glucagon  Injectable 1 milliGRAM(s) IntraMuscular once PRN  insulin glargine Injectable (LANTUS) 20 Unit(s) SubCutaneous at bedtime  insulin lispro (HumaLOG) corrective regimen sliding scale   SubCutaneous three times a day before meals  insulin lispro Injectable (HumaLOG) 7 Unit(s) SubCutaneous three times a day before meals  morphine  - Injectable 4 milliGRAM(s) IV Push every 4 hours PRN  nicotine -  14 mG/24Hr(s) Patch 1 patch Transdermal daily  vancomycin  IVPB 1000 milliGRAM(s) IV Intermittent every 12 hours    ANORO ELLIPTA AEROSOL POWDER BREATH ACTIVATED:   ATORVASTATIN CALCIUM 40MG TABLET:   CLONAZEPAM .5MG TABLET:   DULOXETINE HCL 30MG CAPSULE DELAYED RELEASE PARTICLES:   ENALAPRIL MALEATE 10MG TABLET:   FUROSEMIDE 20MG TABLET:   GABAPENTIN 100MG CAPSULE:   IPRATROPIUM BROMIDE/ALBUTEROL SULFATE SOLUTION:   OXYCODONE/ACETAMINOPHEN TABLET:     Allergies  No Known Allergies  Intolerances    Consent [Done]    H&P [Done]    Medical Clearance [pending]    EKG [Done]  < from: 12 Lead ECG (06.25.18 @ 15:46) >  Ventricular Rate 64 BPM  Atrial Rate 64 BPM  P-R Interval 190 ms  QRS Duration 96 ms  Q-T Interval 462 ms  QTC Calculation(Bezet) 476 ms  P Axis 9 degrees  R Axis 231 degrees  T Axis 99 degrees  Diagnosis Line Sinus rhythm with Premature atrial complexes  Right superior axis deviation  Nonspecific T wave abnormality  Abnormal ECG  Confirmed by Noemi Ross MD ([id number]) on 6/25/2018 6:23:37 PM  < end of copied text >    CXR [Done]  < from: Xray Chest 1 View-PORTABLE IMMEDIATE (06.25.18 @ 15:58) >  EXAM:  XR CHEST PORTABLE IMMED 1V        PROCEDURE DATE:  06/25/2018    INTERPRETATION:  Clinical History / Reason for exam: Preop  Comparison : Chest radiograph July 11, 2016  Technique/Positioning: Single frontal view.  Findings:  Support devices: None.  Cardiac/mediastinum/hilum: Cardiomegaly.  Lung parenchyma/Pleura: Bilateral interstitial opacities.  Skeleton/soft tissues: Stable.  Impression:    Cardiomegaly and bilateral interstitial opacities, unchanged.  LISA TRUONG M.D., ATTENDING RADIOLOGIST  This document has been electronically signed. Jun 26 2018  8:11AM  < end of copied text >    UCG [N/A]    T&S [New Pending]  Type + Screen (06.25.18 @ 15:29)    Type + Screen: O POS                        10.4   10.42 )-----------( 271      ( 28 Jun 2018 05:04 )             32.4     06-28    138  |  97<L>  |  12  ----------------------------<  187<H>  4.2   |  25  |  0.5<L>    Ca    10.0      28 Jun 2018 05:04    CAPILLARY BLOOD GLUCOSE  200 (28 Jun 2018 06:03)  211 (27 Jun 2018 13:25)  205 (27 Jun 2018 12:16)    T(C): 36.4 (06-28-18 @ 04:43), Max: 36.9 (06-27-18 @ 13:25)  HR: 78 (06-28-18 @ 04:43) (72 - 78)  BP: 129/59 (06-28-18 @ 04:43) (129/59 - 135/61)  RR: 18 (06-28-18 @ 04:43) (18 - 19)    Surgeon: Dr. Los Tobias DPM  Assistant (s): Dr. Nas Oh  DPM  Pre Operative Diagnosis: Right foot necrotic ulceration with nonviable forefoot   Planned Procedure: EX DBX ST/B Right foot with possible TMA Right foot     The patient has been educated on the above procedure, with all risks and benefits described. No guarantees were given or implied for the aforementioned procedure.  The above assistant(s) have introduced themselves to the patient. The patient consents to their participation in the procedure listed above.   06-28-18 @ 11:25 Podiatry Pre-Operative Note   [id number] GABY ASHLEY is a 63y year old Female patient presenting to the operating room for surgical management of the Right foot. The patient has failed all conservative measures and requires surgical intervention at this time.    PAST MEDICAL & SURGICAL HISTORY:  Hypertension  Psychiatric disorder  Depression  COPD (chronic obstructive pulmonary disease)  Diabetes  No significant past surgical history      Medications:   ampicillin/sulbactam  IVPB      ampicillin/sulbactam  IVPB 3 Gram(s) IV Intermittent every 6 hours  dextrose 40% Gel 15 Gram(s) Oral once PRN  dextrose 5%. 1000 milliLiter(s) IV Continuous <Continuous>  dextrose 50% Injectable 12.5 Gram(s) IV Push once  dextrose 50% Injectable 25 Gram(s) IV Push once  dextrose 50% Injectable 25 Gram(s) IV Push once  enoxaparin Injectable 40 milliGRAM(s) SubCutaneous daily  furosemide    Tablet 20 milliGRAM(s) Oral daily  glucagon  Injectable 1 milliGRAM(s) IntraMuscular once PRN  insulin glargine Injectable (LANTUS) 20 Unit(s) SubCutaneous at bedtime  insulin lispro (HumaLOG) corrective regimen sliding scale   SubCutaneous three times a day before meals  insulin lispro Injectable (HumaLOG) 7 Unit(s) SubCutaneous three times a day before meals  morphine  - Injectable 4 milliGRAM(s) IV Push every 4 hours PRN  nicotine -  14 mG/24Hr(s) Patch 1 patch Transdermal daily  vancomycin  IVPB 1000 milliGRAM(s) IV Intermittent every 12 hours    ANORO ELLIPTA AEROSOL POWDER BREATH ACTIVATED:   ATORVASTATIN CALCIUM 40MG TABLET:   CLONAZEPAM .5MG TABLET:   DULOXETINE HCL 30MG CAPSULE DELAYED RELEASE PARTICLES:   ENALAPRIL MALEATE 10MG TABLET:   FUROSEMIDE 20MG TABLET:   GABAPENTIN 100MG CAPSULE:   IPRATROPIUM BROMIDE/ALBUTEROL SULFATE SOLUTION:   OXYCODONE/ACETAMINOPHEN TABLET:     Allergies  No Known Allergies  Intolerances    Consent [Done]    H&P [Done]    Medical Clearance [Done:Moderate]    EKG [Done]  < from: 12 Lead ECG (06.25.18 @ 15:46) >  Ventricular Rate 64 BPM  Atrial Rate 64 BPM  P-R Interval 190 ms  QRS Duration 96 ms  Q-T Interval 462 ms  QTC Calculation(Bezet) 476 ms  P Axis 9 degrees  R Axis 231 degrees  T Axis 99 degrees  Diagnosis Line Sinus rhythm with Premature atrial complexes  Right superior axis deviation  Nonspecific T wave abnormality  Abnormal ECG  Confirmed by Noemi Ross MD ([id number]) on 6/25/2018 6:23:37 PM  < end of copied text >    CXR [Done]  < from: Xray Chest 1 View-PORTABLE IMMEDIATE (06.25.18 @ 15:58) >  EXAM:  XR CHEST PORTABLE IMMED 1V        PROCEDURE DATE:  06/25/2018    INTERPRETATION:  Clinical History / Reason for exam: Preop  Comparison : Chest radiograph July 11, 2016  Technique/Positioning: Single frontal view.  Findings:  Support devices: None.  Cardiac/mediastinum/hilum: Cardiomegaly.  Lung parenchyma/Pleura: Bilateral interstitial opacities.  Skeleton/soft tissues: Stable.  Impression:    Cardiomegaly and bilateral interstitial opacities, unchanged.  LISA TRUONG M.D., ATTENDING RADIOLOGIST  This document has been electronically signed. Jun 26 2018  8:11AM  < end of copied text >    UCG [N/A]    T&S [New Pending]  Type + Screen (06.25.18 @ 15:29)    Type + Screen: O POS                        10.4   10.42 )-----------( 271      ( 28 Jun 2018 05:04 )             32.4     06-28    138  |  97<L>  |  12  ----------------------------<  187<H>  4.2   |  25  |  0.5<L>    Ca    10.0      28 Jun 2018 05:04    CAPILLARY BLOOD GLUCOSE  200 (28 Jun 2018 06:03)  211 (27 Jun 2018 13:25)  205 (27 Jun 2018 12:16)    T(C): 36.4 (06-28-18 @ 04:43), Max: 36.9 (06-27-18 @ 13:25)  HR: 78 (06-28-18 @ 04:43) (72 - 78)  BP: 129/59 (06-28-18 @ 04:43) (129/59 - 135/61)  RR: 18 (06-28-18 @ 04:43) (18 - 19)    Surgeon: Dr. Los Tobias DPM  Assistant (s): Dr. Nas Oh  DPM  Pre Operative Diagnosis: Right foot necrotic ulceration with nonviable forefoot   Planned Procedure: EX DBX ST/B Right foot with possible TMA Right foot     The patient has been educated on the above procedure, with all risks and benefits described. No guarantees were given or implied for the aforementioned procedure.  The above assistant(s) have introduced themselves to the patient. The patient consents to their participation in the procedure listed above.   06-28-18 @ 11:25

## 2018-06-29 ENCOUNTER — RESULT REVIEW (OUTPATIENT)
Age: 63
End: 2018-06-29

## 2018-06-29 LAB
ANION GAP SERPL CALC-SCNC: 14 MMOL/L — SIGNIFICANT CHANGE UP (ref 7–14)
APTT BLD: 33.7 SEC — SIGNIFICANT CHANGE UP (ref 27–39.2)
BUN SERPL-MCNC: 14 MG/DL — SIGNIFICANT CHANGE UP (ref 10–20)
CALCIUM SERPL-MCNC: 10 MG/DL — SIGNIFICANT CHANGE UP (ref 8.5–10.1)
CHLORIDE SERPL-SCNC: 102 MMOL/L — SIGNIFICANT CHANGE UP (ref 98–110)
CO2 SERPL-SCNC: 27 MMOL/L — SIGNIFICANT CHANGE UP (ref 17–32)
CREAT SERPL-MCNC: 0.5 MG/DL — LOW (ref 0.7–1.5)
GLUCOSE SERPL-MCNC: 158 MG/DL — HIGH (ref 70–99)
HCT VFR BLD CALC: 34.6 % — LOW (ref 37–47)
HGB BLD-MCNC: 11 G/DL — LOW (ref 12–16)
INR BLD: 1.18 RATIO — SIGNIFICANT CHANGE UP (ref 0.65–1.3)
MCHC RBC-ENTMCNC: 26.4 PG — LOW (ref 27–31)
MCHC RBC-ENTMCNC: 31.8 G/DL — LOW (ref 32–37)
MCV RBC AUTO: 83.2 FL — SIGNIFICANT CHANGE UP (ref 81–99)
NRBC # BLD: 0 /100 WBCS — SIGNIFICANT CHANGE UP (ref 0–0)
PLATELET # BLD AUTO: 293 K/UL — SIGNIFICANT CHANGE UP (ref 130–400)
POTASSIUM SERPL-MCNC: 4.7 MMOL/L — SIGNIFICANT CHANGE UP (ref 3.5–5)
POTASSIUM SERPL-SCNC: 4.7 MMOL/L — SIGNIFICANT CHANGE UP (ref 3.5–5)
PROTHROM AB SERPL-ACNC: 12.7 SEC — SIGNIFICANT CHANGE UP (ref 9.95–12.87)
RBC # BLD: 4.16 M/UL — LOW (ref 4.2–5.4)
RBC # FLD: 20.1 % — HIGH (ref 11.5–14.5)
SODIUM SERPL-SCNC: 143 MMOL/L — SIGNIFICANT CHANGE UP (ref 135–146)
WBC # BLD: 10.87 K/UL — HIGH (ref 4.8–10.8)
WBC # FLD AUTO: 10.87 K/UL — HIGH (ref 4.8–10.8)

## 2018-06-29 RX ORDER — OXYCODONE AND ACETAMINOPHEN 5; 325 MG/1; MG/1
2 TABLET ORAL EVERY 6 HOURS
Qty: 0 | Refills: 0 | Status: DISCONTINUED | OUTPATIENT
Start: 2018-06-29 | End: 2018-06-30

## 2018-06-29 RX ORDER — AMPICILLIN SODIUM AND SULBACTAM SODIUM 250; 125 MG/ML; MG/ML
INJECTION, POWDER, FOR SUSPENSION INTRAMUSCULAR; INTRAVENOUS
Qty: 0 | Refills: 0 | Status: DISCONTINUED | OUTPATIENT
Start: 2018-06-29 | End: 2018-07-03

## 2018-06-29 RX ORDER — MORPHINE SULFATE 50 MG/1
2 CAPSULE, EXTENDED RELEASE ORAL
Qty: 0 | Refills: 0 | Status: DISCONTINUED | OUTPATIENT
Start: 2018-06-29 | End: 2018-06-29

## 2018-06-29 RX ORDER — INSULIN LISPRO 100/ML
7 VIAL (ML) SUBCUTANEOUS
Qty: 0 | Refills: 0 | Status: DISCONTINUED | OUTPATIENT
Start: 2018-06-29 | End: 2018-07-03

## 2018-06-29 RX ORDER — MAGNESIUM HYDROXIDE 400 MG/1
30 TABLET, CHEWABLE ORAL DAILY
Qty: 0 | Refills: 0 | Status: DISCONTINUED | OUTPATIENT
Start: 2018-06-29 | End: 2018-07-03

## 2018-06-29 RX ORDER — DEXTROSE 50 % IN WATER 50 %
12.5 SYRINGE (ML) INTRAVENOUS ONCE
Qty: 0 | Refills: 0 | Status: DISCONTINUED | OUTPATIENT
Start: 2018-06-29 | End: 2018-07-03

## 2018-06-29 RX ORDER — NICOTINE POLACRILEX 2 MG
1 GUM BUCCAL DAILY
Qty: 0 | Refills: 0 | Status: DISCONTINUED | OUTPATIENT
Start: 2018-06-29 | End: 2018-07-03

## 2018-06-29 RX ORDER — DEXTROSE 50 % IN WATER 50 %
25 SYRINGE (ML) INTRAVENOUS ONCE
Qty: 0 | Refills: 0 | Status: DISCONTINUED | OUTPATIENT
Start: 2018-06-29 | End: 2018-07-03

## 2018-06-29 RX ORDER — OXYCODONE AND ACETAMINOPHEN 5; 325 MG/1; MG/1
1 TABLET ORAL EVERY 4 HOURS
Qty: 0 | Refills: 0 | Status: DISCONTINUED | OUTPATIENT
Start: 2018-06-29 | End: 2018-06-30

## 2018-06-29 RX ORDER — MORPHINE SULFATE 50 MG/1
4 CAPSULE, EXTENDED RELEASE ORAL EVERY 4 HOURS
Qty: 0 | Refills: 0 | Status: DISCONTINUED | OUTPATIENT
Start: 2018-06-29 | End: 2018-06-29

## 2018-06-29 RX ORDER — ENOXAPARIN SODIUM 100 MG/ML
40 INJECTION SUBCUTANEOUS DAILY
Qty: 0 | Refills: 0 | Status: DISCONTINUED | OUTPATIENT
Start: 2018-06-29 | End: 2018-07-03

## 2018-06-29 RX ORDER — SODIUM CHLORIDE 9 MG/ML
1000 INJECTION, SOLUTION INTRAVENOUS
Qty: 0 | Refills: 0 | Status: DISCONTINUED | OUTPATIENT
Start: 2018-06-29 | End: 2018-07-03

## 2018-06-29 RX ORDER — OXYCODONE AND ACETAMINOPHEN 5; 325 MG/1; MG/1
1 TABLET ORAL EVERY 4 HOURS
Qty: 0 | Refills: 0 | Status: DISCONTINUED | OUTPATIENT
Start: 2018-06-29 | End: 2018-06-29

## 2018-06-29 RX ORDER — VANCOMYCIN HCL 1 G
1000 VIAL (EA) INTRAVENOUS EVERY 12 HOURS
Qty: 0 | Refills: 0 | Status: DISCONTINUED | OUTPATIENT
Start: 2018-06-29 | End: 2018-07-03

## 2018-06-29 RX ORDER — GLUCAGON INJECTION, SOLUTION 0.5 MG/.1ML
1 INJECTION, SOLUTION SUBCUTANEOUS ONCE
Qty: 0 | Refills: 0 | Status: DISCONTINUED | OUTPATIENT
Start: 2018-06-29 | End: 2018-07-03

## 2018-06-29 RX ORDER — ONDANSETRON 8 MG/1
4 TABLET, FILM COATED ORAL ONCE
Qty: 0 | Refills: 0 | Status: COMPLETED | OUTPATIENT
Start: 2018-06-29 | End: 2018-06-29

## 2018-06-29 RX ORDER — OXYCODONE AND ACETAMINOPHEN 5; 325 MG/1; MG/1
1 TABLET ORAL ONCE
Qty: 0 | Refills: 0 | Status: DISCONTINUED | OUTPATIENT
Start: 2018-06-29 | End: 2018-06-29

## 2018-06-29 RX ORDER — AMPICILLIN SODIUM AND SULBACTAM SODIUM 250; 125 MG/ML; MG/ML
3 INJECTION, POWDER, FOR SUSPENSION INTRAMUSCULAR; INTRAVENOUS ONCE
Qty: 0 | Refills: 0 | Status: COMPLETED | OUTPATIENT
Start: 2018-06-29 | End: 2018-06-29

## 2018-06-29 RX ORDER — DEXTROSE 50 % IN WATER 50 %
15 SYRINGE (ML) INTRAVENOUS ONCE
Qty: 0 | Refills: 0 | Status: DISCONTINUED | OUTPATIENT
Start: 2018-06-29 | End: 2018-07-03

## 2018-06-29 RX ORDER — AMPICILLIN SODIUM AND SULBACTAM SODIUM 250; 125 MG/ML; MG/ML
3 INJECTION, POWDER, FOR SUSPENSION INTRAMUSCULAR; INTRAVENOUS EVERY 6 HOURS
Qty: 0 | Refills: 0 | Status: DISCONTINUED | OUTPATIENT
Start: 2018-06-29 | End: 2018-07-03

## 2018-06-29 RX ORDER — INSULIN GLARGINE 100 [IU]/ML
20 INJECTION, SOLUTION SUBCUTANEOUS AT BEDTIME
Qty: 0 | Refills: 0 | Status: DISCONTINUED | OUTPATIENT
Start: 2018-06-29 | End: 2018-07-03

## 2018-06-29 RX ORDER — FUROSEMIDE 40 MG
20 TABLET ORAL DAILY
Qty: 0 | Refills: 0 | Status: DISCONTINUED | OUTPATIENT
Start: 2018-06-29 | End: 2018-07-03

## 2018-06-29 RX ORDER — INSULIN LISPRO 100/ML
VIAL (ML) SUBCUTANEOUS
Qty: 0 | Refills: 0 | Status: DISCONTINUED | OUTPATIENT
Start: 2018-06-29 | End: 2018-07-03

## 2018-06-29 RX ADMIN — Medication 3: at 17:20

## 2018-06-29 RX ADMIN — OXYCODONE AND ACETAMINOPHEN 2 TABLET(S): 5; 325 TABLET ORAL at 20:57

## 2018-06-29 RX ADMIN — AMPICILLIN SODIUM AND SULBACTAM SODIUM 200 GRAM(S): 250; 125 INJECTION, POWDER, FOR SUSPENSION INTRAMUSCULAR; INTRAVENOUS at 05:09

## 2018-06-29 RX ADMIN — MORPHINE SULFATE 4 MILLIGRAM(S): 50 CAPSULE, EXTENDED RELEASE ORAL at 13:25

## 2018-06-29 RX ADMIN — AMPICILLIN SODIUM AND SULBACTAM SODIUM 200 GRAM(S): 250; 125 INJECTION, POWDER, FOR SUSPENSION INTRAMUSCULAR; INTRAVENOUS at 17:31

## 2018-06-29 RX ADMIN — INSULIN GLARGINE 20 UNIT(S): 100 INJECTION, SOLUTION SUBCUTANEOUS at 21:05

## 2018-06-29 RX ADMIN — MORPHINE SULFATE 4 MILLIGRAM(S): 50 CAPSULE, EXTENDED RELEASE ORAL at 03:01

## 2018-06-29 RX ADMIN — OXYCODONE AND ACETAMINOPHEN 2 TABLET(S): 5; 325 TABLET ORAL at 21:27

## 2018-06-29 RX ADMIN — AMPICILLIN SODIUM AND SULBACTAM SODIUM 200 GRAM(S): 250; 125 INJECTION, POWDER, FOR SUSPENSION INTRAMUSCULAR; INTRAVENOUS at 23:14

## 2018-06-29 RX ADMIN — ENOXAPARIN SODIUM 40 MILLIGRAM(S): 100 INJECTION SUBCUTANEOUS at 12:55

## 2018-06-29 RX ADMIN — Medication 7 UNIT(S): at 17:21

## 2018-06-29 RX ADMIN — Medication 7 UNIT(S): at 12:54

## 2018-06-29 RX ADMIN — AMPICILLIN SODIUM AND SULBACTAM SODIUM 200 GRAM(S): 250; 125 INJECTION, POWDER, FOR SUSPENSION INTRAMUSCULAR; INTRAVENOUS at 11:43

## 2018-06-29 RX ADMIN — Medication 250 MILLIGRAM(S): at 17:31

## 2018-06-29 RX ADMIN — AMPICILLIN SODIUM AND SULBACTAM SODIUM 200 GRAM(S): 250; 125 INJECTION, POWDER, FOR SUSPENSION INTRAMUSCULAR; INTRAVENOUS at 00:06

## 2018-06-29 RX ADMIN — Medication 250 MILLIGRAM(S): at 05:08

## 2018-06-29 RX ADMIN — Medication 1: at 12:54

## 2018-06-29 RX ADMIN — Medication 1 PATCH: at 12:54

## 2018-06-29 NOTE — PROGRESS NOTE ADULT - SUBJECTIVE AND OBJECTIVE BOX
PGY I NOTE    LENGTH OF HOSPITAL STAY:  4d    CHIEF COMPLAINT:Patient is a 63y old  Female who presents with a chief complaint of DFU R foot (25 Jun 2018 21:57)    HPI:HPI:  64 y/o Female smoker  w/ history of PVD, HTN, COPD not on home oxygen ,ELEUTERIO not on cpap , DM-2(unsure about history)  sent in from NH for necrotic Right 2nd digit , referred by her Podiatrist Dr. Tobias.  As per patient she has pain right  foot worsening x 1 week partially relieved by percocet. Denies fever, chills, nausea , vomiting or any constitutional symptoms. As per patient she is being sent in from for IV antibiotics and further evaluation for possible debridement of right toe.    In ED rX ROCEPHIN 2 G, VANCOMYCIN AND PERCOCET (25 Jun 2018 18:48)    OVERNIGHT EVENTS/UPDATES: no acute event overnight    PMH & PSH  PAST MEDICAL & SURGICAL HISTORY:  Hypertension  Psychiatric disorder  Depression  COPD (chronic obstructive pulmonary disease)  Diabetes  No significant past surgical history    SOCIAL HISTORY: Negative    ALLERGIES: No Known Allergies    HOME MEDICATIONS  Home Medications:  ANORO ELLIPTA AEROSOL POWDER BREATH ACTIVATED:  (25 Jun 2018 20:16)  ATORVASTATIN CALCIUM 40MG TABLET:  (25 Jun 2018 20:16)  CLONAZEPAM .5MG TABLET:  (25 Jun 2018 20:16)  DULOXETINE HCL 30MG CAPSULE DELAYED RELEASE PARTICLES:  (25 Jun 2018 20:16)  ENALAPRIL MALEATE 10MG TABLET:  (25 Jun 2018 20:16)  FUROSEMIDE 20MG TABLET:  (25 Jun 2018 20:16)  GABAPENTIN 100MG CAPSULE:  (25 Jun 2018 20:16)  IPRATROPIUM BROMIDE/ALBUTEROL SULFATE SOLUTION:  (25 Jun 2018 20:16)  OXYCODONE/ACETAMINOPHEN TABLET:  (25 Jun 2018 20:16)    PHYSICAL EXAM:  T(F): 98 (06-29-18 @ 09:07), Max: 98.2 (06-28-18 @ 12:39)  HR: 78 (06-29-18 @ 09:43)  BP: 156/69 (06-29-18 @ 09:43)  RR: 18 (06-29-18 @ 09:43)  SpO2: --  CAPILLARY BLOOD GLUCOSE  168 (29 Jun 2018 05:46)  240 (28 Jun 2018 20:24)  159 (28 Jun 2018 16:29)  209 (28 Jun 2018 11:30)        I&O's Summary    28 Jun 2018 07:01  -  29 Jun 2018 07:00  --------------------------------------------------------  IN: 0 mL / OUT: 1 mL / NET: -1 mL      General: NAD  HEENT: NCAT, no JVD  CV: RRR  RESP: CTAB  Abdominal: Soft, NTTP, non-distended  Extremity: b/l foot in bandage, R LE edematous  Neuro: A&O x3, non-focal    MEDICATIONS  STANDING MEDICATIONS    PRN MEDICATIONS    LABS:                        11.0   10.87 )-----------( 293      ( 29 Jun 2018 06:27 )             34.6              06-29    143  |  102  |  14  ----------------------------<  158<H>  4.7   |  27  |  0.5<L>    Ca    10.0      29 Jun 2018 06:27                 PT/INR - ( 29 Jun 2018 06:27 )   PT: 12.70 sec;   INR: 1.18 ratio         PTT - ( 29 Jun 2018 06:27 )  PTT:33.7 sec

## 2018-06-29 NOTE — PROGRESS NOTE ADULT - ASSESSMENT
64 y/o female with h/o PAD, COPD not on home oxygen, ELEUTERIO not on CPAP, DM-2, HTN, psychiatric disorder sent in from NH as per podiatry referral for evaluation and treatment of Right foot gangrene and worsening pain    #Gangrene 2nd right toe with osteomyelitis  -XR shows osteo  -TMA today  -podiatry following  -pain control w/ morphine  -blood culture NTD  -c/w vanco and unasyn per ID    #PAD  -Lasix PO 20 daily  -vascular following  -arterial duplex unremarkable  -venous duplex negative    #HTN, controlled  -DASH diet    #COPD, stable  -f/u outpatient    #DM  -ISS and fingerstick monitoring  -a1c 9.9, need to follow up with PMD    #DVT ppx  -Lovenox    #Code status  -DNR/DNI    #Dispo  -from NH, will go to NH

## 2018-06-29 NOTE — CHART NOTE - NSCHARTNOTEFT_GEN_A_CORE
PACU ANESTHESIA ADMISSION NOTE      Procedure: Amputation: Right transmetatarsal amputation partial closure    Post op diagnosis:  Gangrene      ____  Intubated  TV:______       Rate: ______      FiO2: ______    _x___  Patent Airway    _x___  Full return of protective reflexes    _x___  Full recovery from anesthesia / back to baseline status    Vitals:  T(F): 97.8  HR: 76  BP: 137/67  RR: 16  SpO2: 98%    Mental Status:  _x___ Awake   _x____ Alert   _____ Drowsy   _____ Sedated    Nausea/Vomiting:  _x___  NO       ______Yes,   See Post - Op Orders         Pain Scale (0-10):  __0___    Treatment: _x___ None    ____ See Post - Op/PCA Orders    Post - Operative Fluids:   ____ Oral   _x___ See Post - Op Orders    Plan: Discharge:   ____Home       __x___Floor     _____Critical Care    _____  Other:_________________    Comments:  No anesthesia issues or complications noted.  Discharge when criteria met.

## 2018-06-29 NOTE — BRIEF OPERATIVE NOTE - PROCEDURE
<<-----Click on this checkbox to enter Procedure Amputation  06/29/2018  Right transmetatarsal amputation partial closure  Active  NSZWABA

## 2018-06-29 NOTE — PROGRESS NOTE ADULT - ATTENDING COMMENTS
PT scheduled for sx for wet gangrene will need TMA limb salvage vs BKA.   pt with severe PVD b/l LE prognosis for healing is poor and guarded with ABIs < .41  Appreciate vasc consult
vasc pending
Patient seen and examined at the bedside.   Not in distress.   Agree with above note.  Right foot toe gangrene with osteomyelitis. TMA tomorrow.   Patient is clinically and hemodynamically stable. not in active cardiac risk and at moderate risk for a intermediate risk surgical procedure.   Patient is agreeable to go for the procedure.
Agree with above note.   d/w house staff.   Appreciated vascular evaluation.   TMA / Amputation today.   Follow up podiatry and ID after the procedure.

## 2018-06-29 NOTE — PRE-OP CHECKLIST - ISOLATION PRECAUTIONS
"    Patient Name: Lissette Fitzgerald  :contact

## 2018-06-29 NOTE — PROGRESS NOTE ADULT - ASSESSMENT
IMPRESSION:  Wet gangrene of 2-3rd toe on the right foot with possible forefoot involvement.  Left foot with possible septic arthritis of 3rd mid phalanx.  Blood cultures are negative.  Significant PAD.    RECOMMENDATIONS:  Amputation today  Vancomycin 1 gm iv q12h  Unasyn 3 gm iv q6h.

## 2018-06-29 NOTE — PROGRESS NOTE ADULT - SUBJECTIVE AND OBJECTIVE BOX
GABY ASHLEY  63y, Female      OVERNIGHT EVENTS:    none    VITALS:  T(F): 97.9, Max: 98.2 (06-28-18 @ 12:39)  HR: 80  BP: 128/57  RR: 18Vital Signs Last 24 Hrs  T(C): 36.6 (29 Jun 2018 04:51), Max: 36.8 (28 Jun 2018 12:39)  T(F): 97.9 (29 Jun 2018 04:51), Max: 98.2 (28 Jun 2018 12:39)  HR: 80 (29 Jun 2018 04:51) (73 - 84)  BP: 128/57 (29 Jun 2018 04:51) (115/61 - 149/66)  BP(mean): --  RR: 18 (29 Jun 2018 04:51) (18 - 18)  SpO2: --    TESTS & MEASUREMENTS:                        10.7   12.19 )-----------( 280      ( 28 Jun 2018 18:59 )             33.8     06-28    137  |  95<L>  |  14  ----------------------------<  214<H>  4.3   |  25  |  0.7    Ca    9.8      28 Jun 2018 18:59          Culture - Blood (collected 06-26-18 @ 07:15)  Source: .Blood None  Preliminary Report (06-27-18 @ 17:02):    No growth to date.    Culture - Blood (collected 06-25-18 @ 16:46)  Source: .Blood Blood-Venous  Preliminary Report (06-27-18 @ 01:05):    No growth to date.            RADIOLOGY & ADDITIONAL TESTS:    ANTIBIOTICS:  ampicillin/sulbactam  IVPB      ampicillin/sulbactam  IVPB 3 Gram(s) IV Intermittent every 6 hours  vancomycin  IVPB 1000 milliGRAM(s) IV Intermittent every 12 hours

## 2018-06-30 DIAGNOSIS — E11.621 TYPE 2 DIABETES MELLITUS WITH FOOT ULCER: ICD-10-CM

## 2018-06-30 LAB
ANION GAP SERPL CALC-SCNC: 16 MMOL/L — HIGH (ref 7–14)
BUN SERPL-MCNC: 13 MG/DL — SIGNIFICANT CHANGE UP (ref 10–20)
CALCIUM SERPL-MCNC: 9.8 MG/DL — SIGNIFICANT CHANGE UP (ref 8.5–10.1)
CHLORIDE SERPL-SCNC: 96 MMOL/L — LOW (ref 98–110)
CO2 SERPL-SCNC: 26 MMOL/L — SIGNIFICANT CHANGE UP (ref 17–32)
CREAT SERPL-MCNC: 0.6 MG/DL — LOW (ref 0.7–1.5)
GLUCOSE SERPL-MCNC: 147 MG/DL — HIGH (ref 70–99)
HCT VFR BLD CALC: 33.5 % — LOW (ref 37–47)
HGB BLD-MCNC: 10.6 G/DL — LOW (ref 12–16)
MCHC RBC-ENTMCNC: 26.4 PG — LOW (ref 27–31)
MCHC RBC-ENTMCNC: 31.6 G/DL — LOW (ref 32–37)
MCV RBC AUTO: 83.3 FL — SIGNIFICANT CHANGE UP (ref 81–99)
NRBC # BLD: 0 /100 WBCS — SIGNIFICANT CHANGE UP (ref 0–0)
PLATELET # BLD AUTO: 315 K/UL — SIGNIFICANT CHANGE UP (ref 130–400)
POTASSIUM SERPL-MCNC: 4.5 MMOL/L — SIGNIFICANT CHANGE UP (ref 3.5–5)
POTASSIUM SERPL-SCNC: 4.5 MMOL/L — SIGNIFICANT CHANGE UP (ref 3.5–5)
RBC # BLD: 4.02 M/UL — LOW (ref 4.2–5.4)
RBC # FLD: 20 % — HIGH (ref 11.5–14.5)
SODIUM SERPL-SCNC: 138 MMOL/L — SIGNIFICANT CHANGE UP (ref 135–146)
WBC # BLD: 15.73 K/UL — HIGH (ref 4.8–10.8)
WBC # FLD AUTO: 15.73 K/UL — HIGH (ref 4.8–10.8)

## 2018-06-30 RX ORDER — MORPHINE SULFATE 50 MG/1
2 CAPSULE, EXTENDED RELEASE ORAL ONCE
Qty: 0 | Refills: 0 | Status: DISCONTINUED | OUTPATIENT
Start: 2018-06-30 | End: 2018-06-30

## 2018-06-30 RX ORDER — OXYCODONE HYDROCHLORIDE 5 MG/1
5 TABLET ORAL THREE TIMES A DAY
Qty: 0 | Refills: 0 | Status: DISCONTINUED | OUTPATIENT
Start: 2018-06-30 | End: 2018-06-30

## 2018-06-30 RX ORDER — OXYCODONE AND ACETAMINOPHEN 5; 325 MG/1; MG/1
1 TABLET ORAL EVERY 4 HOURS
Qty: 0 | Refills: 0 | Status: DISCONTINUED | OUTPATIENT
Start: 2018-06-30 | End: 2018-06-30

## 2018-06-30 RX ORDER — OXYCODONE HYDROCHLORIDE 5 MG/1
10 TABLET ORAL ONCE
Qty: 0 | Refills: 0 | Status: DISCONTINUED | OUTPATIENT
Start: 2018-06-30 | End: 2018-06-30

## 2018-06-30 RX ORDER — OXYCODONE AND ACETAMINOPHEN 5; 325 MG/1; MG/1
1 TABLET ORAL EVERY 4 HOURS
Qty: 0 | Refills: 0 | Status: DISCONTINUED | OUTPATIENT
Start: 2018-06-30 | End: 2018-07-03

## 2018-06-30 RX ADMIN — Medication 1: at 16:46

## 2018-06-30 RX ADMIN — OXYCODONE AND ACETAMINOPHEN 1 TABLET(S): 5; 325 TABLET ORAL at 21:31

## 2018-06-30 RX ADMIN — Medication 250 MILLIGRAM(S): at 05:52

## 2018-06-30 RX ADMIN — Medication 7 UNIT(S): at 12:26

## 2018-06-30 RX ADMIN — AMPICILLIN SODIUM AND SULBACTAM SODIUM 200 GRAM(S): 250; 125 INJECTION, POWDER, FOR SUSPENSION INTRAMUSCULAR; INTRAVENOUS at 23:52

## 2018-06-30 RX ADMIN — OXYCODONE AND ACETAMINOPHEN 1 TABLET(S): 5; 325 TABLET ORAL at 01:08

## 2018-06-30 RX ADMIN — Medication 7 UNIT(S): at 07:39

## 2018-06-30 RX ADMIN — AMPICILLIN SODIUM AND SULBACTAM SODIUM 200 GRAM(S): 250; 125 INJECTION, POWDER, FOR SUSPENSION INTRAMUSCULAR; INTRAVENOUS at 12:32

## 2018-06-30 RX ADMIN — Medication 2: at 07:38

## 2018-06-30 RX ADMIN — OXYCODONE HYDROCHLORIDE 5 MILLIGRAM(S): 5 TABLET ORAL at 12:31

## 2018-06-30 RX ADMIN — Medication 1 PATCH: at 12:25

## 2018-06-30 RX ADMIN — OXYCODONE AND ACETAMINOPHEN 2 TABLET(S): 5; 325 TABLET ORAL at 03:00

## 2018-06-30 RX ADMIN — OXYCODONE AND ACETAMINOPHEN 1 TABLET(S): 5; 325 TABLET ORAL at 01:38

## 2018-06-30 RX ADMIN — AMPICILLIN SODIUM AND SULBACTAM SODIUM 200 GRAM(S): 250; 125 INJECTION, POWDER, FOR SUSPENSION INTRAMUSCULAR; INTRAVENOUS at 18:02

## 2018-06-30 RX ADMIN — OXYCODONE HYDROCHLORIDE 5 MILLIGRAM(S): 5 TABLET ORAL at 16:45

## 2018-06-30 RX ADMIN — AMPICILLIN SODIUM AND SULBACTAM SODIUM 200 GRAM(S): 250; 125 INJECTION, POWDER, FOR SUSPENSION INTRAMUSCULAR; INTRAVENOUS at 05:29

## 2018-06-30 RX ADMIN — OXYCODONE AND ACETAMINOPHEN 1 TABLET(S): 5; 325 TABLET ORAL at 22:01

## 2018-06-30 RX ADMIN — Medication 250 MILLIGRAM(S): at 18:02

## 2018-06-30 RX ADMIN — INSULIN GLARGINE 20 UNIT(S): 100 INJECTION, SOLUTION SUBCUTANEOUS at 22:03

## 2018-06-30 RX ADMIN — OXYCODONE HYDROCHLORIDE 10 MILLIGRAM(S): 5 TABLET ORAL at 07:42

## 2018-06-30 RX ADMIN — Medication 2: at 12:27

## 2018-06-30 RX ADMIN — OXYCODONE AND ACETAMINOPHEN 2 TABLET(S): 5; 325 TABLET ORAL at 03:30

## 2018-06-30 RX ADMIN — Medication 7 UNIT(S): at 16:47

## 2018-06-30 NOTE — PROGRESS NOTE ADULT - SUBJECTIVE AND OBJECTIVE BOX
PROGRESS NOTE   Patient is a 63y old  Female who presents with a chief complaint of DFU R foot (25 Jun 2018 21:57)    Patient seen at bedside. Patient expressed desire to be discharged to NH so she could be with her friends. Patient also relates that she is having some pain.     HPI:  62 y/o Female smoker  w/ history of PVD, HTN, COPD not on home oxygen ,ELEUTERIO not on cpap , DM-2(unsure about history)  sent in from NH for necrotic Right 2nd digit , referred by her Podiatrist Dr. Tobias.  As per patient she has pain right  foot worsening x 1 week partially relieved by percocet. Denies fever, chills, nausea , vomiting or any constitutional symptoms. As per patient she is being sent in from for IV antibiotics and further evaluation for possible debridement of right toe.    In ED rX ROCEPHIN 2 G, VANCOMYCIN AND PERCOCET (25 Jun 2018 18:48)      Vital Signs Last 24 Hrs  T(C): 38 (30 Jun 2018 16:00), Max: 38 (30 Jun 2018 16:00)  T(F): 100.4 (30 Jun 2018 16:00), Max: 100.4 (30 Jun 2018 16:00)  HR: 84 (30 Jun 2018 16:00) (79 - 110)  BP: 147/70 (30 Jun 2018 16:00) (138/60 - 162/71)  BP(mean): --  RR: 18 (30 Jun 2018 16:00) (18 - 19)  SpO2: 96% (30 Jun 2018 16:00) (96% - 96%)                          10.6   15.73 )-----------( 315      ( 30 Jun 2018 07:47 )             33.5               06-30    138  |  96<L>  |  13  ----------------------------<  147<H>  4.5   |  26  |  0.6<L>    Ca    9.8      30 Jun 2018 07:47        PHYSICAL EXAM  GEN: GABY ASHLEY is a pleasant well-nourished, well developed 63y Female in no acute distress, alert awake, and oriented to person, place and time.   LE Focused:  right foot surgical site with sutures intact. left foot ulcerations with fibrotic bases to posterior heel and 3rd dorsal digit

## 2018-06-30 NOTE — PROGRESS NOTE ADULT - ASSESSMENT
64 y/o female with h/o PAD, COPD not on home oxygen, ELEUTERIO not on CPAP, DM-2, HTN sent in from NH as per podiatry referral for evaluation and treatment of Right foot gangrene and worsening pain    1-Gangrene 2nd right toe with osteomyelitis:  -Podiatry following –For TMA  -pain control   -c/w vanco and unasyn per ID  -Eventually may need long-term ABX    2-PAD:   -Lasix PO 20 daily  -vascular following  -arterial duplex unremarkable    3-HTN, controlled    4-COPD, stable: Nebs PRN  5-t2DM Uncontrolled: -ISS and fingerstick monitoring  -a1c 9.9  6-DVT, GI PPX  Disposition: Acute, Will eventually need SNF  Code Status: DNR, DNI  Pager No. [phone]

## 2018-06-30 NOTE — PROGRESS NOTE ADULT - SUBJECTIVE AND OBJECTIVE BOX
Chief Complaint:  Patient is a 63y old  Female who presents with a chief complaint of DFU R foot (25 Jun 2018 21:57)      Interval Events:     Allergies:  No Known Allergies      Home Medications:    Hospital Medications:  ampicillin/sulbactam  IVPB 3 Gram(s) IV Intermittent every 6 hours  ampicillin/sulbactam  IVPB      dextrose 40% Gel 15 Gram(s) Oral once PRN  dextrose 5%. 1000 milliLiter(s) IV Continuous <Continuous>  dextrose 50% Injectable 12.5 Gram(s) IV Push once  dextrose 50% Injectable 25 Gram(s) IV Push once  dextrose 50% Injectable 25 Gram(s) IV Push once  enoxaparin Injectable 40 milliGRAM(s) SubCutaneous daily  furosemide    Tablet 20 milliGRAM(s) Oral daily  glucagon  Injectable 1 milliGRAM(s) IntraMuscular once PRN  insulin glargine Injectable (LANTUS) 20 Unit(s) SubCutaneous at bedtime  insulin lispro (HumaLOG) corrective regimen sliding scale   SubCutaneous three times a day before meals  insulin lispro Injectable (HumaLOG) 7 Unit(s) SubCutaneous three times a day before meals  magnesium hydroxide Suspension 30 milliLiter(s) Oral daily PRN  nicotine -  14 mG/24Hr(s) Patch 1 patch Transdermal daily  oxyCODONE    IR 5 milliGRAM(s) Oral three times a day PRN  vancomycin  IVPB 1000 milliGRAM(s) IV Intermittent every 12 hours      PMHX/PSHX:  Hypertension  Psychiatric disorder  Depression  COPD (chronic obstructive pulmonary disease)  Diabetes  No significant past surgical history      Family history:  No pertinent family history in first degree relatives      ROS:   As mentioned below      PHYSICAL EXAM:   Vital Signs:  Vital Signs Last 24 Hrs  T(C): 37.6 (30 Jun 2018 08:21), Max: 37.6 (30 Jun 2018 08:21)  T(F): 99.7 (30 Jun 2018 08:21), Max: 99.7 (30 Jun 2018 08:21)  HR: 84 (30 Jun 2018 08:56) (79 - 110)  BP: 162/71 (30 Jun 2018 08:56) (140/60 - 162/71)  BP(mean): --  RR: 19 (30 Jun 2018 08:21) (18 - 19)  SpO2: --  Daily     Daily     GENERAL:  NAD  HEENT:  NC/AT,  No Thyromegaly  CHEST:  CTA B/L  HEART:  S1, S2- No M, R, G  ABDOMEN:  Soft, NT, ND  EXTEREMITIES:  no cyanosis,clubbing or edema- Right foot dressing  SKIN:  NAD  NEURO:  Grossly Nr.    LABS:                        10.6   15.73 )-----------( 315      ( 30 Jun 2018 07:47 )             33.5     06-30    138  |  96<L>  |  13  ----------------------------<  147<H>  4.5   |  26  |  0.6<L>    Ca    9.8      30 Jun 2018 07:47        PT/INR - ( 29 Jun 2018 06:27 )   PT: 12.70 sec;   INR: 1.18 ratio         PTT - ( 29 Jun 2018 06:27 )  PTT:33.7 sec        Imaging:

## 2018-06-30 NOTE — PROGRESS NOTE ADULT - PROBLEM SELECTOR PLAN 1
Patient examined and evaluated.   Patient left foot partial packing pulled and dressed with adaptic/DSD/Kerlix  Patient left foot dressed with bacitracin/DSD/Kerlix  Will f/u in 24h for next bandage change.

## 2018-06-30 NOTE — PROGRESS NOTE ADULT - SUBJECTIVE AND OBJECTIVE BOX
PGY I NOTE    LENGTH OF HOSPITAL STAY:  5d    CHIEF COMPLAINT:Patient is a 63y old  Female who presents with a chief complaint of DFU R foot (25 Jun 2018 21:57)    HPI:HPI:  62 y/o Female smoker  w/ history of PVD, HTN, COPD not on home oxygen ,ELEUTERIO not on cpap , DM-2(unsure about history)  sent in from NH for necrotic Right 2nd digit , referred by her Podiatrist Dr. Tobias.  As per patient she has pain right  foot worsening x 1 week partially relieved by percocet. Denies fever, chills, nausea , vomiting or any constitutional symptoms. As per patient she is being sent in from for IV antibiotics and further evaluation for possible debridement of right toe.    In ED rX ROCEPHIN 2 G, VANCOMYCIN AND PERCOCET (25 Jun 2018 18:48)    OVERNIGHT EVENTS/UPDATES: no acute event overnight    PMH & PSH  PAST MEDICAL & SURGICAL HISTORY:  Hypertension  Psychiatric disorder  Depression  COPD (chronic obstructive pulmonary disease)  Diabetes  No significant past surgical history    SOCIAL HISTORY: Negative    ALLERGIES: No Known Allergies    HOME MEDICATIONS  Home Medications:  ANORO ELLIPTA AEROSOL POWDER BREATH ACTIVATED:  (25 Jun 2018 20:16)  ATORVASTATIN CALCIUM 40MG TABLET:  (25 Jun 2018 20:16)  CLONAZEPAM .5MG TABLET:  (25 Jun 2018 20:16)  DULOXETINE HCL 30MG CAPSULE DELAYED RELEASE PARTICLES:  (25 Jun 2018 20:16)  ENALAPRIL MALEATE 10MG TABLET:  (25 Jun 2018 20:16)  FUROSEMIDE 20MG TABLET:  (25 Jun 2018 20:16)  GABAPENTIN 100MG CAPSULE:  (25 Jun 2018 20:16)  IPRATROPIUM BROMIDE/ALBUTEROL SULFATE SOLUTION:  (25 Jun 2018 20:16)  OXYCODONE/ACETAMINOPHEN TABLET:  (25 Jun 2018 20:16)    PHYSICAL EXAM:  T(F): 100.4 (06-30-18 @ 16:00), Max: 100.4 (06-30-18 @ 16:00)  HR: 84 (06-30-18 @ 16:00)  BP: 147/70 (06-30-18 @ 16:00)  RR: 18 (06-30-18 @ 16:00)  SpO2: 96% (06-30-18 @ 16:00)  CAPILLARY BLOOD GLUCOSE  157 (30 Jun 2018 16:30)  219 (30 Jun 2018 11:16)  203 (30 Jun 2018 06:10)  273 (29 Jun 2018 20:44)        I&O's Summary    29 Jun 2018 07:01  -  30 Jun 2018 07:00  --------------------------------------------------------  IN: 550 mL / OUT: 400 mL / NET: 150 mL    30 Jun 2018 07:01  -  30 Jun 2018 20:41  --------------------------------------------------------  IN: 0 mL / OUT: 446 mL / NET: -446 mL      General: NAD  HEENT: NCAT, no JVD  CV: RRR  RESP: CTAB  Abdominal: Soft, NTTP, non-distended  Extremity: b/l feet in wraps, R LE edematous  Neuro: A&O x3, non-focal    MEDICATIONS  STANDING MEDICATIONS  ampicillin/sulbactam  IVPB 3 Gram(s) IV Intermittent every 6 hours  ampicillin/sulbactam  IVPB      dextrose 5%. 1000 milliLiter(s) IV Continuous <Continuous>  dextrose 50% Injectable 12.5 Gram(s) IV Push once  dextrose 50% Injectable 25 Gram(s) IV Push once  dextrose 50% Injectable 25 Gram(s) IV Push once  enoxaparin Injectable 40 milliGRAM(s) SubCutaneous daily  furosemide    Tablet 20 milliGRAM(s) Oral daily  insulin glargine Injectable (LANTUS) 20 Unit(s) SubCutaneous at bedtime  insulin lispro (HumaLOG) corrective regimen sliding scale   SubCutaneous three times a day before meals  insulin lispro Injectable (HumaLOG) 7 Unit(s) SubCutaneous three times a day before meals  nicotine -  14 mG/24Hr(s) Patch 1 patch Transdermal daily  vancomycin  IVPB 1000 milliGRAM(s) IV Intermittent every 12 hours    PRN MEDICATIONS  dextrose 40% Gel 15 Gram(s) Oral once PRN  glucagon  Injectable 1 milliGRAM(s) IntraMuscular once PRN  magnesium hydroxide Suspension 30 milliLiter(s) Oral daily PRN  oxyCODONE    IR 5 milliGRAM(s) Oral three times a day PRN    LABS:                        10.6   15.73 )-----------( 315      ( 30 Jun 2018 07:47 )             33.5              06-30    138  |  96<L>  |  13  ----------------------------<  147<H>  4.5   |  26  |  0.6<L>    Ca    9.8      30 Jun 2018 07:47                   PT/INR - ( 29 Jun 2018 06:27 )   PT: 12.70 sec;   INR: 1.18 ratio         PTT - ( 29 Jun 2018 06:27 )  PTT:33.7 sec

## 2018-06-30 NOTE — PROGRESS NOTE ADULT - ASSESSMENT
62 y/o female with h/o PAD, COPD not on home oxygen, ELEUTERIO not on CPAP, DM-2, HTN, psychiatric disorder sent in from NH as per podiatry referral for evaluation and treatment of Right foot gangrene and worsening pain    #Gangrene 2nd right toe with osteomyelitis s/p R TMA 6/29  -XR shows osteo  -podiatry following  -pain control w/ oxycodone  -blood culture NTD  -c/w vanco and unasyn per ID  -c/w dressing changes    #PAD  -Lasix PO 20 daily  -vascular following, no interventions for now  -arterial duplex unremarkable  -venous duplex negative    #HTN, controlled  -DASH diet    #COPD, stable  -f/u outpatient    #DM  -ISS and fingerstick monitoring  -a1c 9.9, need to follow up with PMD    #DVT ppx  -Lovenox    #Code status  -DNR/DNI    #Dispo  -from NH, will go to NH

## 2018-06-30 NOTE — PROGRESS NOTE ADULT - SUBJECTIVE AND OBJECTIVE BOX
POST OP Check    GABY ASHLEY  [id number]                          11.0   10.87 )-----------( 293      ( 29 Jun 2018 06:27 )             34.6     06-29    143  |  102  |  14  ----------------------------<  158<H>  4.7   |  27  |  0.5<L>    Ca    10.0      29 Jun 2018 06:27      Vital Signs Last 24 Hrs  T(C): 36.6 (30 Jun 2018 00:00), Max: 36.7 (29 Jun 2018 02:55)  T(F): 97.9 (30 Jun 2018 00:00), Max: 98 (29 Jun 2018 02:55)  HR: 103 (30 Jun 2018 00:00) (70 - 103)  BP: 143/73 (30 Jun 2018 00:00) (117/64 - 159/70)  BP(mean): --  RR: 18 (30 Jun 2018 00:00) (14 - 25)  SpO2: 96% (29 Jun 2018 11:57) (94% - 98%)    Patient seen at bedside NAD, AAOx3/resting comfortably with pain controlled. Patient tolerated procedure well without incident.   Patient denies nausea, vomiting, chills, fever, SOB.    Procedure: Right transmetatarsal amputation with partial closure.  Surgeon: Dr. dany BRADSHAW Focused: CFT shows adequate perfusion b/l with no signs of ischemic compromise.  No strikethrough is appreciated on surgical dressings.  Dressings were dry, clean, and intact.  No neuromuscular deficits appreciated.

## 2018-07-01 LAB
ANION GAP SERPL CALC-SCNC: 16 MMOL/L — HIGH (ref 7–14)
BUN SERPL-MCNC: 10 MG/DL — SIGNIFICANT CHANGE UP (ref 10–20)
CALCIUM SERPL-MCNC: 9.7 MG/DL — SIGNIFICANT CHANGE UP (ref 8.5–10.1)
CHLORIDE SERPL-SCNC: 99 MMOL/L — SIGNIFICANT CHANGE UP (ref 98–110)
CO2 SERPL-SCNC: 23 MMOL/L — SIGNIFICANT CHANGE UP (ref 17–32)
CREAT SERPL-MCNC: 0.6 MG/DL — LOW (ref 0.7–1.5)
CULTURE RESULTS: SIGNIFICANT CHANGE UP
CULTURE RESULTS: SIGNIFICANT CHANGE UP
GLUCOSE SERPL-MCNC: 188 MG/DL — HIGH (ref 70–99)
HCT VFR BLD CALC: 32.2 % — LOW (ref 37–47)
HGB BLD-MCNC: 10.3 G/DL — LOW (ref 12–16)
MCHC RBC-ENTMCNC: 26.5 PG — LOW (ref 27–31)
MCHC RBC-ENTMCNC: 32 G/DL — SIGNIFICANT CHANGE UP (ref 32–37)
MCV RBC AUTO: 82.8 FL — SIGNIFICANT CHANGE UP (ref 81–99)
NRBC # BLD: 0 /100 WBCS — SIGNIFICANT CHANGE UP (ref 0–0)
PLATELET # BLD AUTO: 285 K/UL — SIGNIFICANT CHANGE UP (ref 130–400)
POTASSIUM SERPL-MCNC: 4.1 MMOL/L — SIGNIFICANT CHANGE UP (ref 3.5–5)
POTASSIUM SERPL-SCNC: 4.1 MMOL/L — SIGNIFICANT CHANGE UP (ref 3.5–5)
RBC # BLD: 3.89 M/UL — LOW (ref 4.2–5.4)
RBC # FLD: 20.3 % — HIGH (ref 11.5–14.5)
SODIUM SERPL-SCNC: 138 MMOL/L — SIGNIFICANT CHANGE UP (ref 135–146)
SPECIMEN SOURCE: SIGNIFICANT CHANGE UP
SPECIMEN SOURCE: SIGNIFICANT CHANGE UP
WBC # BLD: 13.58 K/UL — HIGH (ref 4.8–10.8)
WBC # FLD AUTO: 13.58 K/UL — HIGH (ref 4.8–10.8)

## 2018-07-01 RX ORDER — ACETAMINOPHEN 500 MG
650 TABLET ORAL ONCE
Qty: 0 | Refills: 0 | Status: COMPLETED | OUTPATIENT
Start: 2018-07-01 | End: 2018-07-01

## 2018-07-01 RX ADMIN — Medication 1 PATCH: at 11:50

## 2018-07-01 RX ADMIN — AMPICILLIN SODIUM AND SULBACTAM SODIUM 200 GRAM(S): 250; 125 INJECTION, POWDER, FOR SUSPENSION INTRAMUSCULAR; INTRAVENOUS at 23:34

## 2018-07-01 RX ADMIN — OXYCODONE AND ACETAMINOPHEN 1 TABLET(S): 5; 325 TABLET ORAL at 12:30

## 2018-07-01 RX ADMIN — OXYCODONE AND ACETAMINOPHEN 1 TABLET(S): 5; 325 TABLET ORAL at 11:52

## 2018-07-01 RX ADMIN — INSULIN GLARGINE 20 UNIT(S): 100 INJECTION, SOLUTION SUBCUTANEOUS at 22:20

## 2018-07-01 RX ADMIN — AMPICILLIN SODIUM AND SULBACTAM SODIUM 200 GRAM(S): 250; 125 INJECTION, POWDER, FOR SUSPENSION INTRAMUSCULAR; INTRAVENOUS at 05:39

## 2018-07-01 RX ADMIN — Medication 1 PATCH: at 12:50

## 2018-07-01 RX ADMIN — OXYCODONE AND ACETAMINOPHEN 1 TABLET(S): 5; 325 TABLET ORAL at 06:27

## 2018-07-01 RX ADMIN — Medication 650 MILLIGRAM(S): at 23:09

## 2018-07-01 RX ADMIN — Medication 7 UNIT(S): at 16:30

## 2018-07-01 RX ADMIN — Medication 250 MILLIGRAM(S): at 18:00

## 2018-07-01 RX ADMIN — Medication 4: at 16:29

## 2018-07-01 RX ADMIN — OXYCODONE AND ACETAMINOPHEN 1 TABLET(S): 5; 325 TABLET ORAL at 16:28

## 2018-07-01 RX ADMIN — OXYCODONE AND ACETAMINOPHEN 1 TABLET(S): 5; 325 TABLET ORAL at 22:19

## 2018-07-01 RX ADMIN — AMPICILLIN SODIUM AND SULBACTAM SODIUM 200 GRAM(S): 250; 125 INJECTION, POWDER, FOR SUSPENSION INTRAMUSCULAR; INTRAVENOUS at 11:50

## 2018-07-01 RX ADMIN — Medication 250 MILLIGRAM(S): at 06:27

## 2018-07-01 RX ADMIN — OXYCODONE AND ACETAMINOPHEN 1 TABLET(S): 5; 325 TABLET ORAL at 07:00

## 2018-07-01 RX ADMIN — AMPICILLIN SODIUM AND SULBACTAM SODIUM 200 GRAM(S): 250; 125 INJECTION, POWDER, FOR SUSPENSION INTRAMUSCULAR; INTRAVENOUS at 17:34

## 2018-07-01 NOTE — PROGRESS NOTE ADULT - ASSESSMENT
64 y/o female with h/o PAD, COPD not on home oxygen, ELEUTERIO not on CPAP, DM-2, HTN sent in from NH as per podiatry referral for evaluation and treatment of Right foot gangrene and worsening pain    1-Gangrene 2nd right toe with osteomyelitis:  -Podiatry following –For TMA  -pain control   -c/w vanco and unasyn per ID  -Eventually may need long-term ABX    2-PAD:   -Lasix PO 20 daily  -vascular following  -arterial duplex unremarkable    3-HTN, controlled    4-COPD, stable: Nebs PRN  5-t2DM Uncontrolled: -ISS and fingerstick monitoring  -a1c 9.9  6-DVT, GI PPX  Disposition: Acute, Will eventually need SNF  Code Status: DNR, DNI  Pager No. [phone]

## 2018-07-01 NOTE — PROGRESS NOTE ADULT - SUBJECTIVE AND OBJECTIVE BOX
Chief Complaint:  Patient is a 63y old  Female who presents with a chief complaint of DFU R foot (25 Jun 2018 21:57)      Interval Events:     Allergies:  No Known Allergies      Home Medications:    Hospital Medications:  ampicillin/sulbactam  IVPB 3 Gram(s) IV Intermittent every 6 hours  ampicillin/sulbactam  IVPB      dextrose 40% Gel 15 Gram(s) Oral once PRN  dextrose 5%. 1000 milliLiter(s) IV Continuous <Continuous>  dextrose 50% Injectable 12.5 Gram(s) IV Push once  dextrose 50% Injectable 25 Gram(s) IV Push once  dextrose 50% Injectable 25 Gram(s) IV Push once  enoxaparin Injectable 40 milliGRAM(s) SubCutaneous daily  furosemide    Tablet 20 milliGRAM(s) Oral daily  glucagon  Injectable 1 milliGRAM(s) IntraMuscular once PRN  insulin glargine Injectable (LANTUS) 20 Unit(s) SubCutaneous at bedtime  insulin lispro (HumaLOG) corrective regimen sliding scale   SubCutaneous three times a day before meals  insulin lispro Injectable (HumaLOG) 7 Unit(s) SubCutaneous three times a day before meals  magnesium hydroxide Suspension 30 milliLiter(s) Oral daily PRN  nicotine -  14 mG/24Hr(s) Patch 1 patch Transdermal daily  oxyCODONE    5 mG/acetaminophen 325 mG 1 Tablet(s) Oral every 4 hours PRN  vancomycin  IVPB 1000 milliGRAM(s) IV Intermittent every 12 hours      PMHX/PSHX:  Hypertension  Psychiatric disorder  Depression  COPD (chronic obstructive pulmonary disease)  Diabetes  No significant past surgical history      Family history:  No pertinent family history in first degree relatives      ROS:   As mentioned below      PHYSICAL EXAM:   Vital Signs:  Vital Signs Last 24 Hrs  T(C): 36.6 (01 Jul 2018 12:23), Max: 37.9 (01 Jul 2018 05:01)  T(F): 97.8 (01 Jul 2018 12:23), Max: 100.3 (01 Jul 2018 05:01)  HR: 76 (01 Jul 2018 12:23) (68 - 94)  BP: 164/69 (01 Jul 2018 12:23) (132/66 - 165/98)  BP(mean): --  RR: 18 (01 Jul 2018 12:23) (18 - 18)  SpO2: 94% (01 Jul 2018 13:00) (94% - 98%)  Daily     Daily     GENERAL:  NAD  HEENT:  NC/AT,  No Thyromegaly  CHEST:  CTA B/L  HEART:  S1, S2- No M, R, G  ABDOMEN:  Soft, NT, ND  EXTEREMITIES:  no cyanosis,clubbing or edema  SKIN:  NAD  NEURO:  Grossly Nr.    LABS:                        10.3   13.58 )-----------( 285      ( 01 Jul 2018 09:27 )             32.2     07-01    138  |  99  |  10  ----------------------------<  188<H>  4.1   |  23  |  0.6<L>    Ca    9.7      01 Jul 2018 09:27                Imaging:

## 2018-07-01 NOTE — PROGRESS NOTE ADULT - SUBJECTIVE AND OBJECTIVE BOX
PROGRESS NOTE   Patient is a 63y old  Female who presents with a chief complaint of DFU R foot (25 Jun 2018 21:57), s/p r foot TMA.    Patient seen at bedside. Patient is agitated and wants to be discharged to NH so she could be with her friends. Patient also relates that she is having some pain. Pt denies n/f/v/c/d/sob.         Vital Signs Last 24 Hrs  T(C): 37.7 (01 Jul 2018 06:30), Max: 38 (30 Jun 2018 16:00)  T(F): 99.8 (01 Jul 2018 06:30), Max: 100.4 (30 Jun 2018 16:00)  HR: 82 (01 Jul 2018 05:01) (68 - 94)  BP: 153/66 (01 Jul 2018 05:01) (132/66 - 165/98)  BP(mean): --  RR: 18 (01 Jul 2018 05:01) (18 - 18)  SpO2: 96% (30 Jun 2018 22:33) (96% - 98%)                          10.3   13.58 )-----------( 285      ( 01 Jul 2018 09:27 )             32.2               07-01    138  |  99  |  10  ----------------------------<  188<H>  4.1   |  23  |  0.6<L>    Ca    9.7      01 Jul 2018 09:27        PHYSICAL EXAM  LE Focused:  Right foot surgical site with sutures intact. Erythema, warmth, and pain of the surgical site.  Left foot ulcerations with fibrotic bases to posterior heel and 3rd dorsal digit Nonpalpable pulses b/l.          A:  Left foot s/p TMA  Right Foot Ulcerations  P:  Patient examined and evaluated.   Patient left foot partial packing pulled and dressed with adaptic/DSD/Kerlix  Patient left foot 3rd toe dressed with bacitracin/DSD/Kerlix  Pt left foot heel dressed with santyl/DSD/Kerlex  Will f/u in 24h for next bandage change.   F/U plan with attending

## 2018-07-02 LAB
ANION GAP SERPL CALC-SCNC: 15 MMOL/L — HIGH (ref 7–14)
APPEARANCE UR: CLEAR — SIGNIFICANT CHANGE UP
BILIRUB UR-MCNC: NEGATIVE — SIGNIFICANT CHANGE UP
BUN SERPL-MCNC: 15 MG/DL — SIGNIFICANT CHANGE UP (ref 10–20)
CALCIUM SERPL-MCNC: 9.3 MG/DL — SIGNIFICANT CHANGE UP (ref 8.5–10.1)
CHLORIDE SERPL-SCNC: 97 MMOL/L — LOW (ref 98–110)
CO2 SERPL-SCNC: 24 MMOL/L — SIGNIFICANT CHANGE UP (ref 17–32)
COLOR SPEC: YELLOW — SIGNIFICANT CHANGE UP
CREAT SERPL-MCNC: 0.7 MG/DL — SIGNIFICANT CHANGE UP (ref 0.7–1.5)
DIFF PNL FLD: NEGATIVE — SIGNIFICANT CHANGE UP
EPI CELLS # UR: (no result) /HPF
GLUCOSE SERPL-MCNC: 198 MG/DL — HIGH (ref 70–99)
GLUCOSE UR QL: >=1000
HCT VFR BLD CALC: 29.9 % — LOW (ref 37–47)
HGB BLD-MCNC: 9.7 G/DL — LOW (ref 12–16)
KETONES UR-MCNC: NEGATIVE — SIGNIFICANT CHANGE UP
LEUKOCYTE ESTERASE UR-ACNC: NEGATIVE — SIGNIFICANT CHANGE UP
MCHC RBC-ENTMCNC: 26.9 PG — LOW (ref 27–31)
MCHC RBC-ENTMCNC: 32.4 G/DL — SIGNIFICANT CHANGE UP (ref 32–37)
MCV RBC AUTO: 83.1 FL — SIGNIFICANT CHANGE UP (ref 81–99)
NITRITE UR-MCNC: NEGATIVE — SIGNIFICANT CHANGE UP
NRBC # BLD: 0 /100 WBCS — SIGNIFICANT CHANGE UP (ref 0–0)
PH UR: 6 — SIGNIFICANT CHANGE UP (ref 5–8)
PLATELET # BLD AUTO: 308 K/UL — SIGNIFICANT CHANGE UP (ref 130–400)
POTASSIUM SERPL-MCNC: 4.3 MMOL/L — SIGNIFICANT CHANGE UP (ref 3.5–5)
POTASSIUM SERPL-SCNC: 4.3 MMOL/L — SIGNIFICANT CHANGE UP (ref 3.5–5)
PROT UR-MCNC: 30
RBC # BLD: 3.6 M/UL — LOW (ref 4.2–5.4)
RBC # FLD: 20.4 % — HIGH (ref 11.5–14.5)
SODIUM SERPL-SCNC: 136 MMOL/L — SIGNIFICANT CHANGE UP (ref 135–146)
SP GR SPEC: >=1.03 — SIGNIFICANT CHANGE UP (ref 1.01–1.03)
UROBILINOGEN FLD QL: 0.2 — SIGNIFICANT CHANGE UP (ref 0.2–0.2)
WBC # BLD: 23.53 K/UL — HIGH (ref 4.8–10.8)
WBC # FLD AUTO: 23.53 K/UL — HIGH (ref 4.8–10.8)
WBC UR QL: SIGNIFICANT CHANGE UP /HPF

## 2018-07-02 RX ORDER — LANOLIN ALCOHOL/MO/W.PET/CERES
1 CREAM (GRAM) TOPICAL AT BEDTIME
Qty: 0 | Refills: 0 | Status: DISCONTINUED | OUTPATIENT
Start: 2018-07-02 | End: 2018-07-03

## 2018-07-02 RX ORDER — AMLODIPINE BESYLATE 2.5 MG/1
5 TABLET ORAL ONCE
Qty: 0 | Refills: 0 | Status: COMPLETED | OUTPATIENT
Start: 2018-07-02 | End: 2018-07-02

## 2018-07-02 RX ORDER — COLLAGENASE CLOSTRIDIUM HIST. 250 UNIT/G
1 OINTMENT (GRAM) TOPICAL DAILY
Qty: 0 | Refills: 0 | Status: DISCONTINUED | OUTPATIENT
Start: 2018-07-02 | End: 2018-07-03

## 2018-07-02 RX ADMIN — OXYCODONE AND ACETAMINOPHEN 1 TABLET(S): 5; 325 TABLET ORAL at 18:48

## 2018-07-02 RX ADMIN — AMPICILLIN SODIUM AND SULBACTAM SODIUM 200 GRAM(S): 250; 125 INJECTION, POWDER, FOR SUSPENSION INTRAMUSCULAR; INTRAVENOUS at 17:13

## 2018-07-02 RX ADMIN — OXYCODONE AND ACETAMINOPHEN 1 TABLET(S): 5; 325 TABLET ORAL at 00:39

## 2018-07-02 RX ADMIN — Medication 2: at 08:32

## 2018-07-02 RX ADMIN — Medication 1 MILLIGRAM(S): at 01:59

## 2018-07-02 RX ADMIN — INSULIN GLARGINE 20 UNIT(S): 100 INJECTION, SOLUTION SUBCUTANEOUS at 21:59

## 2018-07-02 RX ADMIN — AMPICILLIN SODIUM AND SULBACTAM SODIUM 200 GRAM(S): 250; 125 INJECTION, POWDER, FOR SUSPENSION INTRAMUSCULAR; INTRAVENOUS at 23:01

## 2018-07-02 RX ADMIN — OXYCODONE AND ACETAMINOPHEN 1 TABLET(S): 5; 325 TABLET ORAL at 23:12

## 2018-07-02 RX ADMIN — Medication 250 MILLIGRAM(S): at 06:34

## 2018-07-02 RX ADMIN — AMPICILLIN SODIUM AND SULBACTAM SODIUM 200 GRAM(S): 250; 125 INJECTION, POWDER, FOR SUSPENSION INTRAMUSCULAR; INTRAVENOUS at 12:48

## 2018-07-02 RX ADMIN — Medication 7 UNIT(S): at 12:50

## 2018-07-02 RX ADMIN — OXYCODONE AND ACETAMINOPHEN 1 TABLET(S): 5; 325 TABLET ORAL at 07:26

## 2018-07-02 RX ADMIN — Medication 3: at 12:50

## 2018-07-02 RX ADMIN — Medication 250 MILLIGRAM(S): at 17:13

## 2018-07-02 RX ADMIN — Medication 7 UNIT(S): at 08:32

## 2018-07-02 RX ADMIN — AMPICILLIN SODIUM AND SULBACTAM SODIUM 200 GRAM(S): 250; 125 INJECTION, POWDER, FOR SUSPENSION INTRAMUSCULAR; INTRAVENOUS at 05:56

## 2018-07-02 RX ADMIN — Medication 30 MILLILITER(S): at 20:13

## 2018-07-02 RX ADMIN — Medication 1 APPLICATION(S): at 17:13

## 2018-07-02 RX ADMIN — Medication 1 MILLIGRAM(S): at 21:59

## 2018-07-02 RX ADMIN — MAGNESIUM HYDROXIDE 30 MILLILITER(S): 400 TABLET, CHEWABLE ORAL at 20:13

## 2018-07-02 RX ADMIN — OXYCODONE AND ACETAMINOPHEN 1 TABLET(S): 5; 325 TABLET ORAL at 12:50

## 2018-07-02 RX ADMIN — OXYCODONE AND ACETAMINOPHEN 1 TABLET(S): 5; 325 TABLET ORAL at 02:31

## 2018-07-02 RX ADMIN — OXYCODONE AND ACETAMINOPHEN 1 TABLET(S): 5; 325 TABLET ORAL at 07:00

## 2018-07-02 NOTE — PROGRESS NOTE ADULT - ASSESSMENT
IMPRESSION:  Wet gangrene of 2-3rd toe on the right foot with possible forefoot involvement.  Blood cultures are negative.  Significant PAD.  S/P amputation.  OR cultures are negative to date.    RECOMMENDATIONS:  Vancomycin 1 gm iv q12h  Unasyn 3 gm iv q6h.  Will discuss with Podiatry.

## 2018-07-02 NOTE — DIETITIAN INITIAL EVALUATION ADULT. - ORAL INTAKE PTA
Patients reports a good appetite, consumes regular meals and snacks, denies use of supplements/good Patients reports a good appetite, consumes regular meals and snacks, denies use of supplements. Pt does not like fish./good good/Patient reports a good appetite, consumes regular meals and snacks, denies use of supplements. Pt does not like fish and has NKFA.

## 2018-07-02 NOTE — DIETITIAN INITIAL EVALUATION ADULT. - FACTORS AFF FOOD INTAKE
although pt has some missing teeth, denies having difficulty chewing/swallowing, Report constipation, denies other GI distress/none although pt has some missing teeth, denies having difficulty chewing/swallowing, Report constipation, denies other GI distress. LBM 6/28/none persistent nausea/none/although pt has some missing teeth, denies having difficulty chewing/swallowing, Pt reports constipation, denies other GI distress. LBM 6/28

## 2018-07-02 NOTE — PROGRESS NOTE ADULT - SUBJECTIVE AND OBJECTIVE BOX
S :  Pt seen at bedside NAD      Patient admits to  (-) Fevers, (+) Chills, (-) Nausea, (-) Vomiting, (-) Shortness of Breath (-) calf pain (-) chest pain   Dressings C/D/I    PMH: Hypertension  Psychiatric disorder  Depression  COPD (chronic obstructive pulmonary disease)  Diabetes    PSH:No significant past surgical history      Allergies:No Known Allergies      Labs:                        10.3   13.58 )-----------( 285      ( 01 Jul 2018 09:27 )             32.2       WBC Trend  13.58<H> Date (07-01 @ 09:27)  15.73<H> Date (06-30 @ 07:47)  10.87<H> Date (06-29 @ 06:27)  12.19<H> Date (06-28 @ 18:59)  10.42 Date (06-28 @ 05:04)  9.73 Date (06-27 @ 06:13)  11.62<H> Date (06-26 @ 07:15)  14.55<H> Date (06-25 @ 15:29)      Chem  07-01    138  |  99  |  10  ----------------------------<  188<H>  4.1   |  23  |  0.6<L>    Ca    9.7      01 Jul 2018 09:27          Culture - Surgical Swab (collected 06-29-18 @ 10:39)  Source: .Surgical Swab None  Preliminary Report (07-01-18 @ 07:26):    No growth        T(F): 97.4 (07-02-18 @ 04:55), Max: 102 (07-01-18 @ 22:18)  HR: 95 (07-02-18 @ 04:55) (76 - 122)  BP: 103/53 (07-02-18 @ 04:55) (103/53 - 177/98)  RR: 20 (07-02-18 @ 04:55) (18 - 22)  SpO2: 94% (07-01-18 @ 13:00) (94% - 94%)  Wt(kg): --  O  s/p TMA    A:   healing well , f/u wound and bld cultures      P: Continue with local wound care and IV ABX per ID   all packing to be removed  tmrrw  rec fever workup, possible D/c wed with PO abx and if med stable

## 2018-07-02 NOTE — DIETITIAN INITIAL EVALUATION ADULT. - PERTINENT LABORATORY DATA
WBC: 23.53, RBC: 3.60, Hgb: 9.7, HCT 29.9, Glucose 198, Reviewed: (7/2): WBC: 23.53, RBC: 3.60, H/H 9.7/29.9, Glucose 198

## 2018-07-02 NOTE — PROGRESS NOTE ADULT - SUBJECTIVE AND OBJECTIVE BOX
SUBJECTIVE:    Patient is a 63y old Female who presents with a chief complaint of DFU R foot (25 Jun 2018 21:57)    Currently admitted to medicine with the primary diagnosis of Diabetic foot ulcer     Today is hospital day 7d. This morning she is resting comfortably in bed and reports no new issues or overnight events.     PAST MEDICAL & SURGICAL HISTORY  Hypertension  Psychiatric disorder  Depression  COPD (chronic obstructive pulmonary disease)  Diabetes  No significant past surgical history    SOCIAL HISTORY:  Negative for smoking/alcohol/drug use.     ALLERGIES:  No Known Allergies    MEDICATIONS:  STANDING MEDICATIONS  ampicillin/sulbactam  IVPB 3 Gram(s) IV Intermittent every 6 hours  ampicillin/sulbactam  IVPB      dextrose 5%. 1000 milliLiter(s) IV Continuous <Continuous>  dextrose 50% Injectable 12.5 Gram(s) IV Push once  dextrose 50% Injectable 25 Gram(s) IV Push once  dextrose 50% Injectable 25 Gram(s) IV Push once  enoxaparin Injectable 40 milliGRAM(s) SubCutaneous daily  furosemide    Tablet 20 milliGRAM(s) Oral daily  insulin glargine Injectable (LANTUS) 20 Unit(s) SubCutaneous at bedtime  insulin lispro (HumaLOG) corrective regimen sliding scale   SubCutaneous three times a day before meals  insulin lispro Injectable (HumaLOG) 7 Unit(s) SubCutaneous three times a day before meals  melatonin 1 milliGRAM(s) Oral at bedtime  nicotine -  14 mG/24Hr(s) Patch 1 patch Transdermal daily  vancomycin  IVPB 1000 milliGRAM(s) IV Intermittent every 12 hours    PRN MEDICATIONS  dextrose 40% Gel 15 Gram(s) Oral once PRN  glucagon  Injectable 1 milliGRAM(s) IntraMuscular once PRN  magnesium hydroxide Suspension 30 milliLiter(s) Oral daily PRN  oxyCODONE    5 mG/acetaminophen 325 mG 1 Tablet(s) Oral every 4 hours PRN    VITALS:   T(F): 98.7  HR: 126  BP: 110/57  RR: 20  SpO2: --    LABS:                        9.7    23.53 )-----------( 308      ( 02 Jul 2018 06:27 )             29.9     07-02    136  |  97<L>  |  15  ----------------------------<  198<H>  4.3   |  24  |  0.7    Ca    9.3      02 Jul 2018 06:27                        PHYSICAL EXAM:    GEN: No acute distress, pt is AAO*3  LUNGS: Clear to auscultation bilaterally   HEART: S1/S2 present.   ABD: Soft, non-tender, non-distended.   EXT: b/l feet in wraps, no LL edema.

## 2018-07-02 NOTE — DIETITIAN INITIAL EVALUATION ADULT. - ENERGY NEEDS
Estimated calorie needs(MSF X AF 1.1-1.2)=1827kcal-1993kcal  Estimated protein needs(1.0-1.2g/kgCBW)= Estimated Calorie Needs: 2679-3443 kcal/day (25-30 kcal/kg IBW)   Estimated Protein Needs: 57-68 gm/day (1-1.2 gm/kg IBW)  Estimated Fluid Needs: 1 ml/kcal

## 2018-07-02 NOTE — DIETITIAN INITIAL EVALUATION ADULT. - NS AS NUTRI INTERV MEALS SNACK
Other (specify)/Continue current diet order Continue current diet order in addition to no fish diet modification/Other (specify)

## 2018-07-02 NOTE — DIETITIAN INITIAL EVALUATION ADULT. - DIET TYPE
DASH/TLC(sodium and cholesterol restricted diet); consistent carbohydrate (evening and snack); pt reports 75% intake/DASH/TLC (sodium and cholesterol restricted diet) DASH/TLC (sodium and cholesterol restricted diet)/pt reports 75% intake of all meal trays./consistent carbohydrate (evening snack)

## 2018-07-02 NOTE — DIETITIAN INITIAL EVALUATION ADULT. - OTHER INFO
initial assessment LOS: Diabetes R. foot ulcer, wet gangrene of 2-3rd toe on the right foot with possible forefoot involvement; Blood cultures are negative; S/P amputation; cultures are negative to date. Pt sent from NH as per podiatry referral for evaluation and treatment of right foot gangrene and worsening pain. Gangrene 2nd right toe with osteomyelitis s/p R TMA; healing well, f/u wound and bld cultures. Continue with local wound care and IV ABX per ID--all packing to be removed  tmrrw. Reason for Assessment: LOS

## 2018-07-02 NOTE — PROGRESS NOTE ADULT - SUBJECTIVE AND OBJECTIVE BOX
GABY ASHLEY  63y, Female      OVERNIGHT EVENTS:    S/P amputation of 4 digits on the right    VITALS:  T(F): 97.4, Max: 102 (07-01-18 @ 22:18)  HR: 95  BP: 103/53  RR: 20Vital Signs Last 24 Hrs  T(C): 36.3 (02 Jul 2018 04:55), Max: 38.9 (01 Jul 2018 22:18)  T(F): 97.4 (02 Jul 2018 04:55), Max: 102 (01 Jul 2018 22:18)  HR: 95 (02 Jul 2018 04:55) (76 - 122)  BP: 103/53 (02 Jul 2018 04:55) (103/53 - 177/98)  BP(mean): --  RR: 20 (02 Jul 2018 04:55) (18 - 22)  SpO2: 94% (01 Jul 2018 13:00) (94% - 94%)    TESTS & MEASUREMENTS:                        10.3   13.58 )-----------( 285      ( 01 Jul 2018 09:27 )             32.2     07-01    138  |  99  |  10  ----------------------------<  188<H>  4.1   |  23  |  0.6<L>    Ca    9.7      01 Jul 2018 09:27          Culture - Surgical Swab (collected 06-29-18 @ 10:39)  Source: .Surgical Swab None  Preliminary Report (07-01-18 @ 07:26):    No growth    Culture - Blood (collected 06-26-18 @ 07:15)  Source: .Blood None  Final Report (07-01-18 @ 17:00):    No growth at 5 days.    Culture - Blood (collected 06-25-18 @ 16:46)  Source: .Blood Blood-Venous  Final Report (07-01-18 @ 01:00):    No growth at 5 days.            RADIOLOGY & ADDITIONAL TESTS:    ANTIBIOTICS:  ampicillin/sulbactam  IVPB 3 Gram(s) IV Intermittent every 6 hours  ampicillin/sulbactam  IVPB      vancomycin  IVPB 1000 milliGRAM(s) IV Intermittent every 12 hours

## 2018-07-03 ENCOUNTER — TRANSCRIPTION ENCOUNTER (OUTPATIENT)
Age: 63
End: 2018-07-03

## 2018-07-03 VITALS
SYSTOLIC BLOOD PRESSURE: 131 MMHG | TEMPERATURE: 99 F | HEART RATE: 96 BPM | RESPIRATION RATE: 19 BRPM | DIASTOLIC BLOOD PRESSURE: 68 MMHG

## 2018-07-03 LAB
ANION GAP SERPL CALC-SCNC: 20 MMOL/L — HIGH (ref 7–14)
BUN SERPL-MCNC: 21 MG/DL — HIGH (ref 10–20)
CALCIUM SERPL-MCNC: 9.4 MG/DL — SIGNIFICANT CHANGE UP (ref 8.5–10.1)
CHLORIDE SERPL-SCNC: 97 MMOL/L — LOW (ref 98–110)
CO2 SERPL-SCNC: 19 MMOL/L — SIGNIFICANT CHANGE UP (ref 17–32)
CREAT SERPL-MCNC: 0.8 MG/DL — SIGNIFICANT CHANGE UP (ref 0.7–1.5)
CULTURE RESULTS: NO GROWTH — SIGNIFICANT CHANGE UP
GLUCOSE SERPL-MCNC: 264 MG/DL — HIGH (ref 70–99)
HCT VFR BLD CALC: 29.6 % — LOW (ref 37–47)
HGB BLD-MCNC: 9.5 G/DL — LOW (ref 12–16)
MCHC RBC-ENTMCNC: 26.5 PG — LOW (ref 27–31)
MCHC RBC-ENTMCNC: 32.1 G/DL — SIGNIFICANT CHANGE UP (ref 32–37)
MCV RBC AUTO: 82.7 FL — SIGNIFICANT CHANGE UP (ref 81–99)
NRBC # BLD: 0 /100 WBCS — SIGNIFICANT CHANGE UP (ref 0–0)
PLATELET # BLD AUTO: 400 K/UL — SIGNIFICANT CHANGE UP (ref 130–400)
POTASSIUM SERPL-MCNC: 4.6 MMOL/L — SIGNIFICANT CHANGE UP (ref 3.5–5)
POTASSIUM SERPL-SCNC: 4.6 MMOL/L — SIGNIFICANT CHANGE UP (ref 3.5–5)
RBC # BLD: 3.58 M/UL — LOW (ref 4.2–5.4)
RBC # FLD: 20.5 % — HIGH (ref 11.5–14.5)
SODIUM SERPL-SCNC: 136 MMOL/L — SIGNIFICANT CHANGE UP (ref 135–146)
SPECIMEN SOURCE: SIGNIFICANT CHANGE UP
WBC # BLD: 15.62 K/UL — HIGH (ref 4.8–10.8)
WBC # FLD AUTO: 15.62 K/UL — HIGH (ref 4.8–10.8)

## 2018-07-03 RX ORDER — DEXTROSE 50 % IN WATER 50 %
25 SYRINGE (ML) INTRAVENOUS ONCE
Qty: 0 | Refills: 0 | Status: DISCONTINUED | OUTPATIENT
Start: 2018-07-03 | End: 2018-07-03

## 2018-07-03 RX ORDER — COLLAGENASE CLOSTRIDIUM HIST. 250 UNIT/G
1 OINTMENT (GRAM) TOPICAL
Qty: 5 | Refills: 0 | OUTPATIENT
Start: 2018-07-03 | End: 2018-08-01

## 2018-07-03 RX ORDER — GABAPENTIN 400 MG/1
100 CAPSULE ORAL
Qty: 90 | Refills: 0 | COMMUNITY

## 2018-07-03 RX ORDER — ATORVASTATIN CALCIUM 80 MG/1
0 TABLET, FILM COATED ORAL
Qty: 30 | Refills: 0 | COMMUNITY

## 2018-07-03 RX ORDER — ATORVASTATIN CALCIUM 80 MG/1
1 TABLET, FILM COATED ORAL
Qty: 30 | Refills: 0 | COMMUNITY

## 2018-07-03 RX ORDER — INSULIN GLARGINE 100 [IU]/ML
25 INJECTION, SOLUTION SUBCUTANEOUS
Qty: 0 | Refills: 0 | COMMUNITY
Start: 2018-07-03

## 2018-07-03 RX ORDER — INSULIN LISPRO 100/ML
7 VIAL (ML) SUBCUTANEOUS
Qty: 0 | Refills: 0 | Status: DISCONTINUED | OUTPATIENT
Start: 2018-07-03 | End: 2018-07-03

## 2018-07-03 RX ORDER — GLUCAGON INJECTION, SOLUTION 0.5 MG/.1ML
1 INJECTION, SOLUTION SUBCUTANEOUS ONCE
Qty: 0 | Refills: 0 | Status: DISCONTINUED | OUTPATIENT
Start: 2018-07-03 | End: 2018-07-03

## 2018-07-03 RX ORDER — DULOXETINE HYDROCHLORIDE 30 MG/1
0 CAPSULE, DELAYED RELEASE ORAL
Qty: 30 | Refills: 0 | COMMUNITY

## 2018-07-03 RX ORDER — INSULIN GLARGINE 100 [IU]/ML
25 INJECTION, SOLUTION SUBCUTANEOUS AT BEDTIME
Qty: 0 | Refills: 0 | Status: DISCONTINUED | OUTPATIENT
Start: 2018-07-03 | End: 2018-07-03

## 2018-07-03 RX ORDER — GABAPENTIN 400 MG/1
0 CAPSULE ORAL
Qty: 90 | Refills: 0 | COMMUNITY

## 2018-07-03 RX ORDER — DEXTROSE 50 % IN WATER 50 %
12.5 SYRINGE (ML) INTRAVENOUS ONCE
Qty: 0 | Refills: 0 | Status: DISCONTINUED | OUTPATIENT
Start: 2018-07-03 | End: 2018-07-03

## 2018-07-03 RX ORDER — INSULIN LISPRO 100/ML
8 VIAL (ML) SUBCUTANEOUS
Qty: 90 | Refills: 0 | OUTPATIENT
Start: 2018-07-03

## 2018-07-03 RX ORDER — LANOLIN ALCOHOL/MO/W.PET/CERES
1 CREAM (GRAM) TOPICAL
Qty: 30 | Refills: 0 | OUTPATIENT
Start: 2018-07-03 | End: 2018-08-01

## 2018-07-03 RX ORDER — CLONAZEPAM 1 MG
0 TABLET ORAL
Qty: 30 | Refills: 0 | COMMUNITY

## 2018-07-03 RX ORDER — DULOXETINE HYDROCHLORIDE 30 MG/1
1 CAPSULE, DELAYED RELEASE ORAL
Qty: 30 | Refills: 0 | COMMUNITY

## 2018-07-03 RX ADMIN — MORPHINE SULFATE 4 MILLIGRAM(S): 50 CAPSULE, EXTENDED RELEASE ORAL at 09:08

## 2018-07-03 RX ADMIN — OXYCODONE AND ACETAMINOPHEN 1 TABLET(S): 5; 325 TABLET ORAL at 09:08

## 2018-07-03 RX ADMIN — Medication 250 MILLIGRAM(S): at 07:01

## 2018-07-03 RX ADMIN — OXYCODONE AND ACETAMINOPHEN 1 TABLET(S): 5; 325 TABLET ORAL at 00:38

## 2018-07-03 RX ADMIN — OXYCODONE AND ACETAMINOPHEN 1 TABLET(S): 5; 325 TABLET ORAL at 13:30

## 2018-07-03 RX ADMIN — OXYCODONE AND ACETAMINOPHEN 1 TABLET(S): 5; 325 TABLET ORAL at 07:44

## 2018-07-03 RX ADMIN — Medication 20 MILLIGRAM(S): at 07:01

## 2018-07-03 RX ADMIN — OXYCODONE AND ACETAMINOPHEN 1 TABLET(S): 5; 325 TABLET ORAL at 03:30

## 2018-07-03 RX ADMIN — Medication 1 APPLICATION(S): at 11:30

## 2018-07-03 RX ADMIN — AMPICILLIN SODIUM AND SULBACTAM SODIUM 200 GRAM(S): 250; 125 INJECTION, POWDER, FOR SUSPENSION INTRAMUSCULAR; INTRAVENOUS at 05:21

## 2018-07-03 RX ADMIN — OXYCODONE AND ACETAMINOPHEN 1 TABLET(S): 5; 325 TABLET ORAL at 14:16

## 2018-07-03 RX ADMIN — OXYCODONE AND ACETAMINOPHEN 1 TABLET(S): 5; 325 TABLET ORAL at 05:00

## 2018-07-03 NOTE — PROGRESS NOTE ADULT - SUBJECTIVE AND OBJECTIVE BOX
PROGRESS NOTE   Patient is a 63y old  Female who presents with a chief complaint of DFU R foot, s/p r foot TMA.  Patient seen at bedside. Patient relates that she is having mild pain. Pt had an episode of elevated WBC with came down this morning, and high temperature both of which are being monitored by the medicine team.  Pt denies n/f/v/c/d/sob at the moment.       Vital Signs Last 24 Hrs  T(C): 36.2 (03 Jul 2018 05:17), Max: 37.1 (02 Jul 2018 13:15)  T(F): 97.2 (03 Jul 2018 05:17), Max: 98.7 (02 Jul 2018 13:15)  HR: 90 (03 Jul 2018 05:26) (66 - 130)  BP: 132/64 (03 Jul 2018 05:17) (108/60 - 132/64)  BP(mean): --  RR: 18 (03 Jul 2018 05:17) (18 - 20)  SpO2: 99% (02 Jul 2018 20:23) (99% - 99%)                          9.5    15.62 )-----------( 400      ( 03 Jul 2018 07:44 )             29.6               07-03    136  |  97<L>  |  21<H>  ----------------------------<  264<H>  4.6   |  19  |  0.8    Ca    9.4      03 Jul 2018 07:44        PHYSICAL EXAM  GEN: GABY ASHLEY is a pleasant well-nourished, well developed 63y Female in no acute distress, alert awake, and oriented to person, place and time.   LE Focused:  LE Focused:  Right foot surgical site with sutures intact. Mild Erythema, warmth, and pain of the surgical site.  Left foot ulcerations with fibrotic bases to posterior heel and 3rd dorsal digit Nonpalpable pulses b/l.          A:  Left foot s/p TMA  Right Foot Ulcerations  P:  Patient examined and evaluated.   Patient left foot packing was pulled and dressed with adaptic/DSD/Kerlix  Patient left foot 3rd toe dressed with bacitracin/DSD/Kerlix  Pt left foot heel dressed with santyl/DSD/Kerlex  Pt is good to D/C from Podiatry standpoint   Recommendation for PO ABx  Will f/u in 24h for next bandage change if in hospital.   F/U plan with attending PROGRESS NOTE   Patient is a 63y old  Female who presents with a chief complaint of DFU R foot, s/p r foot TMA.  Patient seen at bedside. Patient relates that she is having mild pain. Pt had an episode of elevated WBC with came down this morning, and high temperature both of which are being monitored by the medicine team.  Pt denies n/f/v/c/d/sob at the moment.       Vital Signs Last 24 Hrs  T(C): 36.2 (03 Jul 2018 05:17), Max: 37.1 (02 Jul 2018 13:15)  T(F): 97.2 (03 Jul 2018 05:17), Max: 98.7 (02 Jul 2018 13:15)  HR: 90 (03 Jul 2018 05:26) (66 - 130)  BP: 132/64 (03 Jul 2018 05:17) (108/60 - 132/64)  BP(mean): --  RR: 18 (03 Jul 2018 05:17) (18 - 20)  SpO2: 99% (02 Jul 2018 20:23) (99% - 99%)                          9.5    15.62 )-----------( 400      ( 03 Jul 2018 07:44 )             29.6               07-03    136  |  97<L>  |  21<H>  ----------------------------<  264<H>  4.6   |  19  |  0.8    Ca    9.4      03 Jul 2018 07:44        PHYSICAL EXAM  GEN: GABY ASHLEY is a pleasant well-nourished, well developed 63y Female in no acute distress, alert awake, and oriented to person, place and time.   LE Focused:  LE Focused:  Right foot surgical site with sutures intact. Mild Erythema, warmth, and pain of the surgical site.  Left foot ulcerations with fibrotic bases to posterior heel and 3rd dorsal digit Nonpalpable pulses b/l.          A:  Left foot s/p TMA  Right Foot Ulcerations  P:  Patient examined and evaluated.   Patient left foot packing was pulled and dressed with adaptic/DSD/Kerlix  Patient left foot 3rd toe dressed with bacitracin/DSD/Kerlix  Pt left foot heel dressed with santyl/DSD/Kerlex  Pt is good to D/C from Podiatry standpoint   Recommendation for PO ABx  F/u with podiatry as o/p  Will f/u in 24h for next bandage change if in hospital.   F/U plan with attending

## 2018-07-03 NOTE — DISCHARGE NOTE ADULT - CARE PLAN
Principal Discharge DX:	Diabetic foot ulcer  Goal:	eradication of infection  Assessment and plan of treatment:	continue Augmentin 875 mg twice/day for 10 days  Secondary Diagnosis:	Diabetes  Goal:	control blood glucose level within normal levels  Assessment and plan of treatment:	continue ttt with lantus 20 units at bed time, and lispro 8 units 3 times before meals  follow up with PMD in a week Principal Discharge DX:	Diabetic foot ulcer  Goal:	eradication of infection  Assessment and plan of treatment:	continue Augmentin 875 mg twice/day for 10 days  follow up with PMD in a week to assess for healing  Secondary Diagnosis:	Diabetes  Goal:	control blood glucose level within normal levels  Assessment and plan of treatment:	continue ttt with lantus 20 units at bed time, and lispro 8 units 3 times before meals  follow up with PMD in a week

## 2018-07-03 NOTE — DISCHARGE NOTE ADULT - HOSPITAL COURSE
64 y/o Female smoker w/ history of PVD, HTN, COPD not on home oxygen ,ELEUTERIO not on cpap , DM, sent in from NH for necrotic Right 2nd digit.  As per patient she has pain right  foot worsening x 1 week partially relieved by percocet. Denies fever, chills, nausea , vomiting or any constitutional symptoms. podiatry and ID teams were consulted. patient received Vancomycin 1 gm iv q12h and Unasyn 3 gm iv q6h. patient had Rt TMA on 6/29 and had been followed up by podiatry and ID. patient clinical condition improved. patient is clinically stable and cleared for discharge per podiatry and ID. patient to continue Augmentin 875 mg Q12 hr for 10 days. Insulin doses increased to 25 lantus at bed nazario and 8 units lispro before meals 3 times a day. pt to follow up with podiatry in a week to assess for healing. patient to follow up with PMD in a week.

## 2018-07-03 NOTE — PROGRESS NOTE ADULT - ASSESSMENT
IMPRESSION:  Wet gangrene of 2-3rd toe on the right foot with possible forefoot involvement.  Blood cultures are negative.  Significant PAD.  S/P amputation.  OR cultures are negative to date.  Significant leucocytosis but offers no complaints    RECOMMENDATIONS:  Vancomycin 1 gm iv q12h  Unasyn 3 gm iv q6h.

## 2018-07-03 NOTE — DISCHARGE NOTE ADULT - PROVIDER TOKENS
FREE:[LAST:[.......],FIRST:[..........],PHONE:[(   )    -],FAX:[(   )    -],ADDRESS:[Ronda garcia [facility] doctor]]

## 2018-07-03 NOTE — PROGRESS NOTE ADULT - ASSESSMENT
62 y/o Female smoker  w/ history of PVD, HTN, COPD not on home oxygen, ELEUTERIO not on cpap  and DM-2 sent in from NH for wet gangrene of 2nd and 3rd toes and involving the right forefoot. She underwent right TMA (POD # 4) and was IV vanco and unasyn. Today she feels well and has no complaints. She had leukocytosis up to 23 yesterday and it is now improved at 15. No evidence of new infection and Podiatry has cleared her for outpt f/u. ID also clears pt for PO abx.     1. Wet gangrene s/p right TMA  10 days of Augmentin per ID  wound care per podiatry  outpt f/u with podiatry  pt at risk for nonhealing and recurrent infection if she continues to have uncontrolled DM, diet noncompliance and continues to smoke    2. DM type 2 - uncontrolled  increase lantus and humalog and f/u FS at SNF    3. PVD    4. Tobacco abuse - smoking cessation     5. COPD/ELEUTERIO - outpt pulm f/u    5. HTN - BP acceptable    Spent 35 minutes in the discharge process of this pt

## 2018-07-03 NOTE — DISCHARGE NOTE ADULT - MEDICATION SUMMARY - MEDICATIONS TO TAKE
I will START or STAY ON the medications listed below when I get home from the hospital:    OXYCODONE/ACETAMINOPHEN TABLET  -- Indication: For Pain control    ENALAPRIL MALEATE 10MG TABLET  -- Indication: For HTN    GABAPENTIN 100MG CAPSULE  -- Indication: For Diabetic neuropathy    CLONAZEPAM .5MG TABLET  -- Indication: For anexity    DULOXETINE HCL 30MG CAPSULE DELAYED RELEASE PARTICLES  -- Indication: For Depression    insulin glargine  -- 25 unit(s) subcutaneous once a day (at bedtime)  -- Indication: For DM    ATORVASTATIN CALCIUM 40MG TABLET  -- Indication: For DLD    IPRATROPIUM BROMIDE/ALBUTEROL SULFATE SOLUTION  -- Indication: For bronchodilator    ANORO ELLIPTA AEROSOL POWDER BREATH ACTIVATED  -- Indication: For bronchodilator    FUROSEMIDE 20MG TABLET  -- Indication: For Hypertension

## 2018-07-03 NOTE — PROGRESS NOTE ADULT - PROVIDER SPECIALTY LIST ADULT
Hospitalist
Infectious Disease
Infectious Disease
Internal Medicine
Podiatry
Infectious Disease
Internal Medicine
Podiatry
Infectious Disease
Internal Medicine
Internal Medicine
Infectious Disease

## 2018-07-03 NOTE — DISCHARGE NOTE ADULT - MEDICATION SUMMARY - MEDICATIONS TO STOP TAKING
I will STOP taking the medications listed below when I get home from the hospital:    BACLOFEN 10MG TABLET    CARVEDILOL 6.25MG TABLET    LACTULOSE 10GM/15ML SOLUTION    METOLAZONE 2.5MG TABLET    MIRTAZAPINE 45MG TABLET

## 2018-07-03 NOTE — PROGRESS NOTE ADULT - SUBJECTIVE AND OBJECTIVE BOX
GABY ASHLEY  63y, Female      OVERNIGHT EVENTS:    no fevers, no diarrhea, no complaints.    VITALS:  T(F): 97.2, Max: 98.7 (07-02-18 @ 13:15)  HR: 90  BP: 132/64  RR: 18Vital Signs Last 24 Hrs  T(C): 36.2 (03 Jul 2018 05:17), Max: 37.1 (02 Jul 2018 13:15)  T(F): 97.2 (03 Jul 2018 05:17), Max: 98.7 (02 Jul 2018 13:15)  HR: 90 (03 Jul 2018 05:26) (66 - 130)  BP: 132/64 (03 Jul 2018 05:17) (108/60 - 132/64)  BP(mean): --  RR: 18 (03 Jul 2018 05:17) (18 - 20)  SpO2: 99% (02 Jul 2018 20:23) (99% - 99%)    TESTS & MEASUREMENTS:                        9.7    23.53 )-----------( 308      ( 02 Jul 2018 06:27 )             29.9     07-02    136  |  97<L>  |  15  ----------------------------<  198<H>  4.3   |  24  |  0.7    Ca    9.3      02 Jul 2018 06:27          Culture - Surgical Swab (collected 06-29-18 @ 10:39)  Source: .Surgical Swab None  Preliminary Report (07-01-18 @ 07:26):    No growth      Urinalysis Basic - ( 02 Jul 2018 15:14 )    Color: Yellow / Appearance: Clear / SG: >=1.030 / pH: x  Gluc: x / Ketone: Negative  / Bili: Negative / Urobili: 0.2   Blood: x / Protein: 30 / Nitrite: Negative   Leuk Esterase: Negative / RBC: x / WBC 1-2 /HPF   Sq Epi: x / Non Sq Epi: Occasional /HPF / Bacteria: x          RADIOLOGY & ADDITIONAL TESTS:    ANTIBIOTICS:  ampicillin/sulbactam  IVPB 3 Gram(s) IV Intermittent every 6 hours  ampicillin/sulbactam  IVPB      vancomycin  IVPB 1000 milliGRAM(s) IV Intermittent every 12 hours

## 2018-07-03 NOTE — DISCHARGE NOTE ADULT - PATIENT PORTAL LINK FT
You can access the [facility] Patient Portal, offered by [facility], by registering with the following website: [email or web address]

## 2018-07-03 NOTE — PROGRESS NOTE ADULT - SUBJECTIVE AND OBJECTIVE BOX
Discharge note    GABY ASHLEY  63y Female    INTERVAL HPI/OVERNIGHT EVENTS:    Pt feels well. No complaints. No diarrhea.   Podiatry states wound is doing well and that she is stable for discharge from their standpoint.   The pt wants to go home.   Case discussed with ID who will recommend PO abx for discharge.     T(F): 97.2 (07-03-18 @ 05:17), Max: 97.9 (07-02-18 @ 21:31)  HR: 90 (07-03-18 @ 05:26) (66 - 130)  BP: 132/64 (07-03-18 @ 05:17) (108/60 - 132/64)  RR: 18 (07-03-18 @ 05:17) (18 - 20)  SpO2: 99% (07-02-18 @ 20:23) (99% - 99%) on RA    I&O's Summary    02 Jul 2018 07:01  -  03 Jul 2018 07:00  --------------------------------------------------------  IN: 480 mL / OUT: 0 mL / NET: 480 mL      CAPILLARY BLOOD GLUCOSE  345 (03 Jul 2018 11:24)  273 (03 Jul 2018 07:24)  381 (02 Jul 2018 21:31)  118 (02 Jul 2018 16:15)  66 (02 Jul 2018 15:35)    PHYSICAL EXAM:  GENERAL: NAD  HEAD:  Normocephalic  EYES:  conjunctiva and sclera clear  ENMT: Moist mucous membranes  NECK: Supple  NERVOUS SYSTEM:  Alert & Oriented X3, Good concentration  CHEST/LUNG: Clear to percussion bilaterally; No rales, rhonchi, wheezing  HEART: Regular rate and rhythm; No murmurs  ABDOMEN: Soft, Nontender, Nondistended; Bowel sounds present  EXTREMITIES:   Right foot with dressing (s/p TMA)  Left foot with dressing    Consultant(s) Notes Reviewed:  [x ] YES  [ ] NO  Care Discussed with Consultants/Other Providers [ x] YES  [ ] NO    MEDICATIONS  (STANDING):  ampicillin/sulbactam  IVPB 3 Gram(s) IV Intermittent every 6 hours  ampicillin/sulbactam  IVPB      collagenase Ointment 1 Application(s) Topical daily  dextrose 5%. 1000 milliLiter(s) (50 mL/Hr) IV Continuous <Continuous>  dextrose 50% Injectable 12.5 Gram(s) IV Push once  dextrose 50% Injectable 25 Gram(s) IV Push once  dextrose 50% Injectable 25 Gram(s) IV Push once  enoxaparin Injectable 40 milliGRAM(s) SubCutaneous daily  furosemide    Tablet 20 milliGRAM(s) Oral daily  insulin glargine Injectable (LANTUS) 20 Unit(s) SubCutaneous at bedtime  insulin lispro (HumaLOG) corrective regimen sliding scale   SubCutaneous three times a day before meals  insulin lispro Injectable (HumaLOG) 7 Unit(s) SubCutaneous three times a day before meals  melatonin 1 milliGRAM(s) Oral at bedtime  nicotine -  14 mG/24Hr(s) Patch 1 patch Transdermal daily  vancomycin  IVPB 1000 milliGRAM(s) IV Intermittent every 12 hours    MEDICATIONS  (PRN):  dextrose 40% Gel 15 Gram(s) Oral once PRN Blood Glucose LESS THAN 70 milliGRAM(s)/deciliter  glucagon  Injectable 1 milliGRAM(s) IntraMuscular once PRN Glucose LESS THAN 70 milligrams/deciliter  magnesium hydroxide Suspension 30 milliLiter(s) Oral daily PRN Constipation  oxyCODONE    5 mG/acetaminophen 325 mG 1 Tablet(s) Oral every 4 hours PRN Severe Pain (7 - 10)      LABS:                        9.5    15.62 )-----------( 400      ( 03 Jul 2018 07:44 )             29.6     07-03    136  |  97<L>  |  21<H>  ----------------------------<  264<H>  4.6   |  19  |  0.8    Ca    9.4      03 Jul 2018 07:44    Culture - Surgical Swab (06.29.18 @ 10:39)    Specimen Source: .Surgical Swab None    Culture Results:   No growth    Culture - Blood in AM (06.26.18 @ 07:15)    Specimen Source: .Blood None    Culture Results:   No growth at 5 days.    Culture - Blood (06.25.18 @ 16:46)    Specimen Source: .Blood Blood-Venous    Culture Results:   No growth at 5 days.    Urine Microscopic-Add On (NC) (07.02.18 @ 15:14)    White Blood Cell - Urine: 1-2 /HPF    Epithelial Cells: Occasional /HPF      Urinalysis (07.02.18 @ 15:14)    Glucose Qualitative, Urine: >=1000    Blood, Urine: Negative    pH Urine: 6.0    Color: Yellow    Urine Appearance: Clear    Bilirubin: Negative    Ketone - Urine: Negative    Specific Gravity: >=1.030    Protein, Urine: 30    Urobilinogen: 0.2    Nitrite: Negative    Leukocyte Esterase Concentration: Negative        Case discussed with resident    Care discussed with pt

## 2018-07-09 ENCOUNTER — APPOINTMENT (OUTPATIENT)
Dept: UROLOGY | Facility: CLINIC | Age: 63
End: 2018-07-09

## 2018-07-09 LAB — SURGICAL PATHOLOGY STUDY: SIGNIFICANT CHANGE UP

## 2018-07-11 DIAGNOSIS — F17.210 NICOTINE DEPENDENCE, CIGARETTES, UNCOMPLICATED: ICD-10-CM

## 2018-07-11 DIAGNOSIS — R50.9 FEVER, UNSPECIFIED: ICD-10-CM

## 2018-07-11 DIAGNOSIS — J44.9 CHRONIC OBSTRUCTIVE PULMONARY DISEASE, UNSPECIFIED: ICD-10-CM

## 2018-07-11 DIAGNOSIS — D72.829 ELEVATED WHITE BLOOD CELL COUNT, UNSPECIFIED: ICD-10-CM

## 2018-07-11 DIAGNOSIS — M86.671 OTHER CHRONIC OSTEOMYELITIS, RIGHT ANKLE AND FOOT: ICD-10-CM

## 2018-07-11 DIAGNOSIS — I10 ESSENTIAL (PRIMARY) HYPERTENSION: ICD-10-CM

## 2018-07-11 DIAGNOSIS — L97.514 NON-PRESSURE CHRONIC ULCER OF OTHER PART OF RIGHT FOOT WITH NECROSIS OF BONE: ICD-10-CM

## 2018-07-11 DIAGNOSIS — F32.9 MAJOR DEPRESSIVE DISORDER, SINGLE EPISODE, UNSPECIFIED: ICD-10-CM

## 2018-07-11 DIAGNOSIS — E11.65 TYPE 2 DIABETES MELLITUS WITH HYPERGLYCEMIA: ICD-10-CM

## 2018-07-11 DIAGNOSIS — M86.171 OTHER ACUTE OSTEOMYELITIS, RIGHT ANKLE AND FOOT: ICD-10-CM

## 2018-07-11 DIAGNOSIS — E11.69 TYPE 2 DIABETES MELLITUS WITH OTHER SPECIFIED COMPLICATION: ICD-10-CM

## 2018-07-11 DIAGNOSIS — M00.9 PYOGENIC ARTHRITIS, UNSPECIFIED: ICD-10-CM

## 2018-07-11 DIAGNOSIS — L97.529 NON-PRESSURE CHRONIC ULCER OF OTHER PART OF LEFT FOOT WITH UNSPECIFIED SEVERITY: ICD-10-CM

## 2018-07-11 DIAGNOSIS — Z66 DO NOT RESUSCITATE: ICD-10-CM

## 2018-07-11 DIAGNOSIS — E11.52 TYPE 2 DIABETES MELLITUS WITH DIABETIC PERIPHERAL ANGIOPATHY WITH GANGRENE: ICD-10-CM

## 2018-07-11 DIAGNOSIS — E11.621 TYPE 2 DIABETES MELLITUS WITH FOOT ULCER: ICD-10-CM

## 2018-07-11 DIAGNOSIS — G47.33 OBSTRUCTIVE SLEEP APNEA (ADULT) (PEDIATRIC): ICD-10-CM

## 2018-07-19 PROBLEM — I10 ESSENTIAL (PRIMARY) HYPERTENSION: Chronic | Status: ACTIVE | Noted: 2018-06-25

## 2018-07-19 PROBLEM — F32.9 MAJOR DEPRESSIVE DISORDER, SINGLE EPISODE, UNSPECIFIED: Chronic | Status: ACTIVE | Noted: 2018-06-25

## 2018-07-19 PROBLEM — E11.9 TYPE 2 DIABETES MELLITUS WITHOUT COMPLICATIONS: Chronic | Status: ACTIVE | Noted: 2018-06-25

## 2018-07-19 PROBLEM — F99 MENTAL DISORDER, NOT OTHERWISE SPECIFIED: Chronic | Status: ACTIVE | Noted: 2018-06-25

## 2018-07-19 PROBLEM — J44.9 CHRONIC OBSTRUCTIVE PULMONARY DISEASE, UNSPECIFIED: Chronic | Status: ACTIVE | Noted: 2018-06-25

## 2018-07-26 ENCOUNTER — APPOINTMENT (OUTPATIENT)
Dept: UROLOGY | Facility: CLINIC | Age: 63
End: 2018-07-26
Payer: MEDICARE

## 2018-07-26 VITALS
WEIGHT: 198 LBS | SYSTOLIC BLOOD PRESSURE: 134 MMHG | BODY MASS INDEX: 32.99 KG/M2 | DIASTOLIC BLOOD PRESSURE: 79 MMHG | HEIGHT: 65 IN | HEART RATE: 77 BPM

## 2018-07-26 DIAGNOSIS — C64.9 MALIGNANT NEOPLASM OF UNSPECIFIED KIDNEY, EXCEPT RENAL PELVIS: ICD-10-CM

## 2018-07-26 PROCEDURE — 99213 OFFICE O/P EST LOW 20 MIN: CPT

## 2018-07-26 NOTE — LETTER HEADER
[FreeTextEntry3] : Dr. Nas Olmos\par [address]\par [address]\[place] [address]

## 2018-07-26 NOTE — END OF VISIT
[FreeTextEntry3] : I have seen and Evaluated the patient with NP Cathie Cerrato\par I agree with the content of her progress note and the plan of care outlined\par

## 2018-07-26 NOTE — HISTORY OF PRESENT ILLNESS
[Currently Experiencing ___] :  [unfilled] [Urinary Incontinence] : urinary incontinence [Urinary Urgency] : urinary urgency [Urinary Frequency] : urinary frequency [Nocturia] : nocturia [None] : None [FreeTextEntry1] : GABY ASHLEY is a 63 year old female with a past medical history of left renal mass and kidney stones, patient passed on her own approx 41 years ago. Patient resides in [facility] and is here with aide. Presents to the office today for a follow up, last seen by Dr. Mercado on 6/20/2018. Patient had incidental finding of left renal mass at [facility]. Left upper renal pole 6.1 x 5.4 x 5.7 cm concerning for renal carcinoma on 4/2018. Patient currently has frequency, incontinence, and urgency x 5 months. Smokes 1/2 pack a day x 35 years. Denies family history of renal and bladder cancer. Patient has brought in CD of CT scan and is here to review. [Straining] : no straining [Weak Stream] : no weak stream

## 2018-07-26 NOTE — ASSESSMENT
[FreeTextEntry1] : 62 yo with endophytic mass identified in the ER at outside facility\par I reviewed the films from the CT scan on 4/2018\par the images were post contrast only they were also obtained 3 months prior\par she has also complained of difficulty with ambulation but has not had imaging of the brain\par \par - pre and post contrast imaging of the kidneys (renal protocol CT scan)\par - brain CT scan to R/O mets\par - f/u with Dr. Burgess for discussion regarding mgt of the left renal mass

## 2018-07-26 NOTE — LETTER BODY
[Dear  ___] : Dear  [unfilled], [Consult Letter:] : I had the pleasure of evaluating your patient, [unfilled]. [Please see my note below.] : Please see my note below. [Consult Closing:] : Thank you very much for allowing me to participate in the care of this patient.  If you have any questions, please do not hesitate to contact me. [Sincerely,] : Sincerely, [DrWale  ___] : Dr. VIRK [FreeTextEntry2] : Dr. Willa Hogde

## 2018-08-13 ENCOUNTER — APPOINTMENT (OUTPATIENT)
Dept: UROLOGY | Facility: CLINIC | Age: 63
End: 2018-08-13
Payer: MEDICARE

## 2018-08-13 VITALS
BODY MASS INDEX: 32.99 KG/M2 | WEIGHT: 198 LBS | HEART RATE: 77 BPM | HEIGHT: 65 IN | SYSTOLIC BLOOD PRESSURE: 148 MMHG | DIASTOLIC BLOOD PRESSURE: 76 MMHG

## 2018-08-13 PROCEDURE — 99214 OFFICE O/P EST MOD 30 MIN: CPT

## 2018-08-13 RX ORDER — ASPIRIN 325 MG/1
325 TABLET ORAL
Refills: 0 | Status: ACTIVE | COMMUNITY

## 2018-08-31 ENCOUNTER — APPOINTMENT (OUTPATIENT)
Dept: UROLOGY | Facility: CLINIC | Age: 63
End: 2018-08-31

## 2018-09-21 ENCOUNTER — APPOINTMENT (OUTPATIENT)
Dept: UROLOGY | Facility: CLINIC | Age: 63
End: 2018-09-21
Payer: MEDICARE

## 2018-09-21 VITALS — HEART RATE: 160 BPM | DIASTOLIC BLOOD PRESSURE: 87 MMHG | SYSTOLIC BLOOD PRESSURE: 122 MMHG

## 2018-09-21 DIAGNOSIS — N28.89 OTHER SPECIFIED DISORDERS OF KIDNEY AND URETER: ICD-10-CM

## 2018-09-21 PROCEDURE — 99214 OFFICE O/P EST MOD 30 MIN: CPT

## 2019-06-09 PROBLEM — I51.9 HEART DISEASE: Status: ACTIVE | Noted: 2018-06-20

## 2020-02-08 NOTE — ED PROVIDER NOTE - NSTOBACCO TYPE_GEN_A_CORE_RD
The patient is a 34 y/o with PMhx of PCOS and Factor 5 Leiden mutation, hx of , salpingectomy, presenting with abd. pain x1 day. CT scan findings concerning for closed loop bowel obstruction, now s/p Dx laparoscopy and open SBR for meckels diverticulum .    Plan:  - Pain control as needed, tylenol and oxycodone  - tolerating CLD, will consider advancing to regular  - d/c IVF  - DVT ppx lovenox  - OOB and ambulating as tolerated  - F/u AM labs. Cigarettes

## 2021-05-25 NOTE — ED ADULT NURSE NOTE - PERSON CONFIRMING BED ASSIGNMENT
Patient : Meme Mixon Age: 11 year old Sex: female   MRN: [id number] Encounter Date: 5/24/2021      History     Chief Complaint   Patient presents with   • Knee Injury     HPI     Meme Mixon is a 11 year old female who presents to the emergency department with complaints of left knee pain for 1 day.  Patient states they were jumping on a trampoline and fell  sister proceeded to land on her left knee.  She then went to school and ambulated without difficulty.  Pain is nonradiating, stable, moderate in severity and described as aching and soreness. Symptoms are worse with movement. They have tried nothing for pain with no relief. Patient denies any further complaints or modifying factors at this time.        Allergies   Allergen Reactions   • Peanut - Dietary Use Only SWELLING       Discharge Medication List as of 5/24/2021 10:39 PM      Prior to Admission Medications    Details   prednisoLONE (PRELONE) 15 MG/5ML SYRP Take 7.27 mLs by mouth daily.Normal, Disp-37 mL, R-0             No past medical history on file.    No past surgical history on file.    No family history on file.    Social History     Tobacco Use   • Smoking status: Not on file   Substance Use Topics   • Alcohol use: Not on file   • Drug use: Not on file       E-cigarette/Vaping     E-Cigarette/Vaping Substances & Devices       Review of Systems   Constitutional: Negative.    Respiratory: Negative.    Cardiovascular: Negative.    Musculoskeletal: Positive for arthralgias. Negative for back pain, joint swelling, myalgias, neck pain and neck stiffness.   Skin: Negative.    Neurological: Negative.    Psychiatric/Behavioral: Negative.        Physical Exam     ED Triage Vitals [05/24/21 2143]   ED Triage Vitals Group      Temp 98.7 °F (37.1 °C)      Heart Rate (!) 56      Resp 18      /67      SpO2 100 %      EtCO2 mmHg       Height 4' 11\" (1.499 m)      Weight 143 lb 4.8 oz (65 kg)      Weight Scale Used Standing scale       This office note has been dictated.     BMI (Calculated) 28.94      IBW/kg (Calculated) 43.2       Physical Exam   Constitutional: She appears well-developed and well-nourished. No distress.   HENT:   Mouth/Throat: Mucous membranes are moist.   Eyes: Pupils are equal, round, and reactive to light. Conjunctivae and EOM are normal.   Cardiovascular: Regular rhythm.   Pulses:       Posterior tibial pulses are 2+ on the left side.   Pulmonary/Chest: Effort normal. There is normal air entry.   Musculoskeletal:      Cervical back: Normal range of motion and neck supple.      Left hip: Normal.      Left knee: No swelling, deformity, effusion, erythema, ecchymosis or bony tenderness. Normal range of motion. Tenderness present over the medial joint line and lateral joint line. No patellar tendon tenderness. No LCL laxity or MCL laxity.Normal patellar mobility.      Left ankle: Normal.      Left foot: Normal.      Comments: Patellar tendon intact.    Neurological: She is alert.   Skin: Skin is warm and dry. Capillary refill takes less than 3 seconds. She is not diaphoretic.   Nursing note and vitals reviewed.      ED Course     Procedures    Lab Results     No results found for this visit on 05/24/21.    Radiology Results     Imaging Results          XR Knee 3 View Left (Final result)  Result time 05/24/21 22:33:29    Final result                 Impression:    FINDINGS/IMPRESSION:    No acute fracture or dislocation. No significant joint effusion. Joint  spaces are maintained.                   Narrative:    XR KNEE 3 VW LEFT    HISTORY:  Fall, pain      COMPARISON: None.     TECHNIQUE:  3 views of the left knee                                 ED Medication Orders (From admission, onward)    Ordered Start     Status Ordering Provider    05/24/21 2201 05/24/21 2202  ibuprofen (CHILDRENS ADVIL) 100 MG/5ML suspension 400 mg  ONCE      Last MAR action: Given MILO GAMA               [facility]     Patient's x-ray unremarkable.  She has normal range of motion of her  left knee.  I do not appreciate any significant swelling.  No superficial changes.  CMS intact distally.  I did offer crutches and patient would like crutches for at home.  Ace wrap applied.  I discussed with mother that she should follow-up with [facility] if pain persists for 1 week.  Over-the-counter Tylenol, Motrin, and RICE as needed for pain.     I have discussed the diagnosis and risks, as well as follow-up with the patient's PCP. I discussed the possible diagnoses with the patient as well as the warning signs and symptoms. The patient understands that this is a provisional diagnosis which can and do change. The diagnosis that the patient was discharged with today is based on the symptoms with which they presented. I discussed in great length returning to the ED immediately if new or worsening symptoms occur. I discussed the symptoms that are most concerning that necessitate immediate return. The patient verbalizes understanding of all discharge instructions, stating there are no further questions and/or concerns at this time. The patient was discharged home, ambulatory in stable condition.       Clinical Impression     ED Diagnosis   1. Acute pain of left knee         Disposition        Discharge 5/24/2021 10:38 PM  Meme Mixon discharge to home/self care.                         MIKE Ornelas  05/24/21 2090       MIKE Ornelas  05/25/21 0019     nahum

## 2021-11-24 NOTE — PRE-ANESTHESIA EVALUATION ADULT - NSPROPOSEDPROCEDFT_GEN_ALL_CORE
Dressing off in 48 hours  Okay to shower in 48 hours  Steris will remain in place  No hot tub or soaking incision in water for 2 weeks  Okay to use soap over incision  Tylenol and Ibuprofen for pain control   Debridement of soft tissue and bone right foot with possible metatarsal amputation

## 2023-01-06 NOTE — PRE-ANESTHESIA EVALUATION ADULT - NSANTHRISKNONERD_GEN_ALL_CORE
OK for refill.  -WS
This medication refill is regarding a electronic request. Refill requested by  [employer] [facility] . Requested Prescriptions     Pending Prescriptions Disp Refills    budesonide-formoterol (SYMBICORT) 160-4.5 MCG/ACT AERO [Pharmacy Med Name: SYMBICORT 160-4.5 MCG INHALER] 30.6 g      Sig: inhale 2 puffs by mouth twice a day **RINSE MOUTH AFTER USE**     Date of last visit: 12/2/2022   Date of next visit: 2/13/2023  Date of last refill: 2/17/22 #3/3    Rx verified, ordered and set to EP.
Risk Alerts: